# Patient Record
Sex: FEMALE | Race: WHITE | Employment: FULL TIME | URBAN - METROPOLITAN AREA
[De-identification: names, ages, dates, MRNs, and addresses within clinical notes are randomized per-mention and may not be internally consistent; named-entity substitution may affect disease eponyms.]

---

## 2021-09-29 ENCOUNTER — HOSPITAL ENCOUNTER (EMERGENCY)
Age: 51
Discharge: HOME | End: 2021-09-29
Payer: COMMERCIAL

## 2021-09-29 ENCOUNTER — APPOINTMENT (OUTPATIENT)
Dept: CT IMAGING | Age: 51
End: 2021-09-29
Payer: COMMERCIAL

## 2021-09-29 VITALS
HEART RATE: 80 BPM | TEMPERATURE: 98.2 F | WEIGHT: 180 LBS | OXYGEN SATURATION: 99 % | RESPIRATION RATE: 16 BRPM | HEIGHT: 62 IN | DIASTOLIC BLOOD PRESSURE: 95 MMHG | BODY MASS INDEX: 33.13 KG/M2 | SYSTOLIC BLOOD PRESSURE: 155 MMHG

## 2021-09-29 DIAGNOSIS — R10.9 FLANK PAIN: Primary | ICD-10-CM

## 2021-09-29 DIAGNOSIS — R73.9 HYPERGLYCEMIA: ICD-10-CM

## 2021-09-29 LAB
ALBUMIN SERPL-MCNC: 4.7 G/DL (ref 3.5–5.2)
ALP BLD-CCNC: 102 U/L (ref 35–104)
ALT SERPL-CCNC: 16 U/L (ref 0–32)
ANION GAP SERPL CALCULATED.3IONS-SCNC: 16 MMOL/L (ref 7–16)
AST SERPL-CCNC: 34 U/L (ref 0–31)
BACTERIA: ABNORMAL /HPF
BASOPHILS ABSOLUTE: 0.03 E9/L (ref 0–0.2)
BASOPHILS RELATIVE PERCENT: 0.3 % (ref 0–2)
BILIRUB SERPL-MCNC: 1.3 MG/DL (ref 0–1.2)
BILIRUBIN URINE: ABNORMAL
BLOOD, URINE: NEGATIVE
BUN BLDV-MCNC: 12 MG/DL (ref 6–20)
CALCIUM SERPL-MCNC: 10.2 MG/DL (ref 8.6–10.2)
CHLORIDE BLD-SCNC: 91 MMOL/L (ref 98–107)
CLARITY: CLEAR
CO2: 22 MMOL/L (ref 22–29)
COLOR: YELLOW
CREAT SERPL-MCNC: 0.6 MG/DL (ref 0.5–1)
EOSINOPHILS ABSOLUTE: 0.04 E9/L (ref 0.05–0.5)
EOSINOPHILS RELATIVE PERCENT: 0.4 % (ref 0–6)
EPITHELIAL CELLS, UA: ABNORMAL /HPF
GFR AFRICAN AMERICAN: >60
GFR NON-AFRICAN AMERICAN: >60 ML/MIN/1.73
GLUCOSE BLD-MCNC: 232 MG/DL (ref 74–99)
GLUCOSE URINE: 500 MG/DL
HCT VFR BLD CALC: 49.6 % (ref 34–48)
HEMOGLOBIN: 16.8 G/DL (ref 11.5–15.5)
IMMATURE GRANULOCYTES #: 0.06 E9/L
IMMATURE GRANULOCYTES %: 0.5 % (ref 0–5)
KETONES, URINE: >=80 MG/DL
LACTIC ACID: 1.8 MMOL/L (ref 0.5–2.2)
LEUKOCYTE ESTERASE, URINE: NEGATIVE
LIPASE: 56 U/L (ref 13–60)
LYMPHOCYTES ABSOLUTE: 0.71 E9/L (ref 1.5–4)
LYMPHOCYTES RELATIVE PERCENT: 6.4 % (ref 20–42)
MCH RBC QN AUTO: 26.9 PG (ref 26–35)
MCHC RBC AUTO-ENTMCNC: 33.9 % (ref 32–34.5)
MCV RBC AUTO: 79.4 FL (ref 80–99.9)
MONOCYTES ABSOLUTE: 0.61 E9/L (ref 0.1–0.95)
MONOCYTES RELATIVE PERCENT: 5.5 % (ref 2–12)
NEUTROPHILS ABSOLUTE: 9.6 E9/L (ref 1.8–7.3)
NEUTROPHILS RELATIVE PERCENT: 86.9 % (ref 43–80)
NITRITE, URINE: NEGATIVE
PDW BLD-RTO: 13 FL (ref 11.5–15)
PH UA: 5.5 (ref 5–9)
PLATELET # BLD: 198 E9/L (ref 130–450)
PMV BLD AUTO: 10.6 FL (ref 7–12)
POTASSIUM REFLEX MAGNESIUM: 4.6 MMOL/L (ref 3.5–5)
PROTEIN UA: 30 MG/DL
RBC # BLD: 6.25 E12/L (ref 3.5–5.5)
RBC UA: ABNORMAL /HPF (ref 0–2)
SODIUM BLD-SCNC: 129 MMOL/L (ref 132–146)
SPECIFIC GRAVITY UA: >=1.03 (ref 1–1.03)
TOTAL PROTEIN: 8.3 G/DL (ref 6.4–8.3)
UROBILINOGEN, URINE: 0.2 E.U./DL
WBC # BLD: 11.1 E9/L (ref 4.5–11.5)
WBC UA: ABNORMAL /HPF (ref 0–5)

## 2021-09-29 PROCEDURE — 6360000002 HC RX W HCPCS: Performed by: NURSE PRACTITIONER

## 2021-09-29 PROCEDURE — 96375 TX/PRO/DX INJ NEW DRUG ADDON: CPT

## 2021-09-29 PROCEDURE — 99283 EMERGENCY DEPT VISIT LOW MDM: CPT

## 2021-09-29 PROCEDURE — 85025 COMPLETE CBC W/AUTO DIFF WBC: CPT

## 2021-09-29 PROCEDURE — 83690 ASSAY OF LIPASE: CPT

## 2021-09-29 PROCEDURE — 83605 ASSAY OF LACTIC ACID: CPT

## 2021-09-29 PROCEDURE — 2580000003 HC RX 258: Performed by: NURSE PRACTITIONER

## 2021-09-29 PROCEDURE — 87088 URINE BACTERIA CULTURE: CPT

## 2021-09-29 PROCEDURE — 74176 CT ABD & PELVIS W/O CONTRAST: CPT

## 2021-09-29 PROCEDURE — 96374 THER/PROPH/DIAG INJ IV PUSH: CPT

## 2021-09-29 PROCEDURE — 81001 URINALYSIS AUTO W/SCOPE: CPT

## 2021-09-29 PROCEDURE — 80053 COMPREHEN METABOLIC PANEL: CPT

## 2021-09-29 RX ORDER — ORPHENADRINE CITRATE 100 MG/1
100 TABLET, EXTENDED RELEASE ORAL 2 TIMES DAILY
Qty: 14 TABLET | Refills: 0 | Status: SHIPPED | OUTPATIENT
Start: 2021-09-29 | End: 2021-10-06

## 2021-09-29 RX ORDER — ONDANSETRON 2 MG/ML
4 INJECTION INTRAMUSCULAR; INTRAVENOUS ONCE
Status: COMPLETED | OUTPATIENT
Start: 2021-09-29 | End: 2021-09-29

## 2021-09-29 RX ORDER — 0.9 % SODIUM CHLORIDE 0.9 %
1000 INTRAVENOUS SOLUTION INTRAVENOUS ONCE
Status: COMPLETED | OUTPATIENT
Start: 2021-09-29 | End: 2021-09-29

## 2021-09-29 RX ORDER — MORPHINE SULFATE 8 MG/ML
6 INJECTION, SOLUTION INTRAMUSCULAR; INTRAVENOUS ONCE
Status: COMPLETED | OUTPATIENT
Start: 2021-09-29 | End: 2021-09-29

## 2021-09-29 RX ADMIN — MORPHINE SULFATE 6 MG: 8 INJECTION, SOLUTION INTRAMUSCULAR; INTRAVENOUS at 18:06

## 2021-09-29 RX ADMIN — ONDANSETRON 4 MG: 2 INJECTION INTRAMUSCULAR; INTRAVENOUS at 17:26

## 2021-09-29 RX ADMIN — SODIUM CHLORIDE 1000 ML: 9 INJECTION, SOLUTION INTRAVENOUS at 17:26

## 2021-09-29 ASSESSMENT — PAIN DESCRIPTION - PROGRESSION: CLINICAL_PROGRESSION: GRADUALLY WORSENING

## 2021-09-29 ASSESSMENT — PAIN DESCRIPTION - DESCRIPTORS: DESCRIPTORS: CRAMPING

## 2021-09-29 ASSESSMENT — PAIN SCALES - GENERAL
PAINLEVEL_OUTOF10: 2
PAINLEVEL_OUTOF10: 7
PAINLEVEL_OUTOF10: 7

## 2021-09-29 ASSESSMENT — PAIN DESCRIPTION - FREQUENCY
FREQUENCY: CONTINUOUS
FREQUENCY: CONTINUOUS

## 2021-09-29 ASSESSMENT — PAIN DESCRIPTION - ONSET: ONSET: GRADUAL

## 2021-09-29 ASSESSMENT — PAIN DESCRIPTION - LOCATION
LOCATION: ABDOMEN
LOCATION: BACK

## 2021-09-29 ASSESSMENT — PAIN DESCRIPTION - PAIN TYPE
TYPE: ACUTE PAIN
TYPE: ACUTE PAIN

## 2021-09-29 ASSESSMENT — PAIN DESCRIPTION - ORIENTATION: ORIENTATION: LEFT

## 2021-09-29 NOTE — ED PROVIDER NOTES
FIRST PROVIDER CONTACT ASSESSMENT NOTE                                                                                                Department of Emergency Medicine                                                      First Provider Note  21  3:43 PM EDT  NAME: Marshall Stevens  : 1970  MRN: [id number]    Chief Complaint: Abdominal Pain (left), Back Pain (lumbar), and Emesis      History of Present Illness:   Marshall Stevens is a 54 y.o. female who presents to the ED for left sided upper abdominal pain, back pain. Onset last week. N/V. Denies history of kidney stones. Denies urinary burning or hematuria. Focused Physical Exam:  VS:    ED Triage Vitals [21 1540]   BP Temp Temp src Pulse Resp SpO2 Height Weight   (!) 179/89 98.2 °F (36.8 °C) -- 109 18 99 % 5' 2\" (1.575 m) 180 lb (81.6 kg)        General: Alert and in no apparent distress. Medical History:  has a past medical history of Arthritis. Surgical History:  has a past surgical history that includes Ankle surgery. Social History:  reports that she has never smoked. She does not have any smokeless tobacco history on file. She reports that she does not drink alcohol and does not use drugs. Family History: family history is not on file. Allergies: Patient has no known allergies.      Initial Plan of Care:  Initiate Treatment-Testing, Proceed toTreatment Area When Bed Available for ED Attending/MLP to Continue Care    -------------------------------------------------END OF FIRST PROVIDER CONTACT ASSESSMENT NOTE--------------------------------------------------------  Electronically signed by Margot Pedroza PA-C   DD: 21       Margot Pedroza PA-C  [phone]

## 2021-09-29 NOTE — ED PROVIDER NOTES
[address] [place] [address]  Department of Emergency Medicine   ED  Encounter Note  Admit Date/RoomTime: 2021  4:47 PM  ED Room: [facility]    NAME: Jona Vaughn  : 1970  MRN: [id number]     Chief Complaint:  Abdominal Pain (left), Back Pain (lumbar), and Emesis    History of Present Illness       Jona Vaughn is a 54 y.o. old female who presents to the emergency department by private vehicle, for complaints of constant of pain in the left flank without radiation which began 1 week(s) prior to arrival.  Since onset the symptoms have been persistent and moderate in severity. Associated sign's & symptoms: Nausea and several episodes of vomiting. The symptoms are aggravated by nothing and relieved by nothing. She denies headache, fever, chills, neck pain, chest pain, shortness of breath, cough, diarrhea, dysuria, or rash. .  ROS   Pertinent positives and negatives are stated within HPI, all other systems reviewed and are negative. Past Medical History:  has a past medical history of Arthritis. Surgical History:  has a past surgical history that includes Ankle surgery and Hysterectomy. Social History:  reports that she has never smoked. She does not have any smokeless tobacco history on file. She reports that she does not drink alcohol and does not use drugs. Family History: family history is not on file. Allergies: Patient has no known allergies. Physical Exam   Oxygen Saturation Interpretation: Normal.        ED Triage Vitals [21 1540]   BP Temp Temp src Pulse Resp SpO2 Height Weight   (!) 179/89 98.2 °F (36.8 °C) -- 109 18 99 % 5' 2\" (1.575 m) 180 lb (81.6 kg)         Constitutional:  Alert, development consistent with age. HEENT:  NC/NT. Airway patent. Neck:  Normal ROM. Supple.   Respiratory:  Clear to auscultation and breath sounds equal.  CV:  Regular rate and rhythm, normal heart sounds, without pathological murmurs, ectopy, gallops, or rubs.  GI:  normal appearing, non-distended with no visible hernias. Bowel sounds: normal bowel sounds. Tenderness: No abdominal tenderness, guarding, rebound, rigidity or pulsatile mass. .        Liver: non-tender. Spleen:  non-tender and non-palpable. /Back: CVA Tenderness: No CVA tenderness. Integument:  Normal turgor. Warm, dry, without visible rash, unless noted elsewhere. Lymphatics: No lymphangitis or adenopathy noted. Neurological:  Oriented. Motor functions intact.     Lab / Imaging Results   (All laboratory and radiology results have been personally reviewed by myself)  Labs:  Results for orders placed or performed during the hospital encounter of 09/29/21   CBC Auto Differential   Result Value Ref Range    WBC 11.1 4.5 - 11.5 E9/L    RBC 6.25 (H) 3.50 - 5.50 E12/L    Hemoglobin 16.8 (H) 11.5 - 15.5 g/dL    Hematocrit 49.6 (H) 34.0 - 48.0 %    MCV 79.4 (L) 80.0 - 99.9 fL    MCH 26.9 26.0 - 35.0 pg    MCHC 33.9 32.0 - 34.5 %    RDW 13.0 11.5 - 15.0 fL    Platelets 982 950 - 676 E9/L    MPV 10.6 7.0 - 12.0 fL    Neutrophils % 86.9 (H) 43.0 - 80.0 %    Immature Granulocytes % 0.5 0.0 - 5.0 %    Lymphocytes % 6.4 (L) 20.0 - 42.0 %    Monocytes % 5.5 2.0 - 12.0 %    Eosinophils % 0.4 0.0 - 6.0 %    Basophils % 0.3 0.0 - 2.0 %    Neutrophils Absolute 9.60 (H) 1.80 - 7.30 E9/L    Immature Granulocytes # 0.06 E9/L    Lymphocytes Absolute 0.71 (L) 1.50 - 4.00 E9/L    Monocytes Absolute 0.61 0.10 - 0.95 E9/L    Eosinophils Absolute 0.04 (L) 0.05 - 0.50 E9/L    Basophils Absolute 0.03 0.00 - 0.20 E9/L   Comprehensive Metabolic Panel w/ Reflex to MG   Result Value Ref Range    Sodium 129 (L) 132 - 146 mmol/L    Potassium reflex Magnesium 4.6 3.5 - 5.0 mmol/L    Chloride 91 (L) 98 - 107 mmol/L    CO2 22 22 - 29 mmol/L    Anion Gap 16 7 - 16 mmol/L    Glucose 232 (H) 74 - 99 mg/dL    BUN 12 6 - 20 mg/dL    CREATININE 0.6 0.5 - 1.0 mg/dL    GFR Non-African American >60 >=60 mL/min/1.73 GFR African American >60     Calcium 10.2 8.6 - 10.2 mg/dL    Total Protein 8.3 6.4 - 8.3 g/dL    Albumin 4.7 3.5 - 5.2 g/dL    Total Bilirubin 1.3 (H) 0.0 - 1.2 mg/dL    Alkaline Phosphatase 102 35 - 104 U/L    ALT 16 0 - 32 U/L    AST 34 (H) 0 - 31 U/L   Lactic Acid, Plasma   Result Value Ref Range    Lactic Acid 1.8 0.5 - 2.2 mmol/L   Urinalysis   Result Value Ref Range    Color, UA Yellow Straw/Yellow    Clarity, UA Clear Clear    Glucose, Ur 500 (A) Negative mg/dL    Bilirubin Urine SMALL (A) Negative    Ketones, Urine >=80 (A) Negative mg/dL    Specific Gravity, UA >=1.030 1.005 - 1.030    Blood, Urine Negative Negative    pH, UA 5.5 5.0 - 9.0    Protein, UA 30 (A) Negative mg/dL    Urobilinogen, Urine 0.2 <2.0 E.U./dL    Nitrite, Urine Negative Negative    Leukocyte Esterase, Urine Negative Negative   Lipase   Result Value Ref Range    Lipase 56 13 - 60 U/L   Microscopic Urinalysis   Result Value Ref Range    WBC, UA 2-5 0 - 5 /HPF    RBC, UA 1-3 0 - 2 /HPF    Epithelial Cells, UA FEW /HPF    Bacteria, UA FEW (A) None Seen /HPF       Imaging: All Radiology results interpreted by Radiologist unless otherwise noted. CT ABDOMEN PELVIS WO CONTRAST Additional Contrast? None   Final Result   1. No evidence of urolithiasis or hydronephrosis. 2. Colonic diverticulosis without evidence of diverticulitis. 3. Mild splenomegaly. 4. Fatty liver. 5. Probable small hiatal hernia. 6. Bilateral sacroiliitis. ED Course / Medical Decision Making     Medications   0.9 % sodium chloride bolus (0 mLs IntraVENous Stopped 9/29/21 2000)   morphine sulfate (PF) injection 6 mg (6 mg IntraVENous Given 9/29/21 1806)   ondansetron (ZOFRAN) injection 4 mg (4 mg IntraVENous Given 9/29/21 1726)          Re-examination:  9/29/21       Time: 1815  Patients condition is improving after treatment. Patient appears comfortable. Consult(s):   None    Procedure(s):   None.     MDM:   Patient presented with left flank pain.  Her diagnostics reviewed and discussed with her. CT of the abdomen pelvis was negative for acute pathology. She was noted to have a blood glucose of 232 and this was discussed with the patient. All of her diagnostics were reviewed and discussed with her. She was treated symptomatically in the emergency department and her symptoms greatly improved. She was negative for acute abdomen. She appeared well nontoxic. She is appropriate for discharge and outpatient follow-up. She is instructed to return to the emergency department immediately with any new or worsening symptoms. She is prescribed Norflex for muscular pain. Plan of Care/Counseling:  RENNY Butt CNP reviewed today's visit with the patient in addition to providing specific details for the plan of care and counseling regarding the diagnosis and prognosis. Questions are answered at this time and are agreeable with the plan. Assessment      1. Flank pain    2. Hyperglycemia      Plan   Discharged home. Patient condition is good    New Medications     Discharge Medication List as of 9/29/2021  7:59 PM      START taking these medications    Details   orphenadrine (NORFLEX) 100 MG extended release tablet Take 1 tablet by mouth 2 times daily for 7 days, Disp-14 tablet, R-0Print           Electronically signed by RENNY Butt CNP   DD: 9/29/21  **This report was transcribed using voice recognition software. Every effort was made to ensure accuracy; however, inadvertent computerized transcription errors may be present.   END OF ED PROVIDER NOTE       RENNY Tyson CNP  09/29/21 2040

## 2021-10-01 LAB — URINE CULTURE, ROUTINE: NORMAL

## 2021-10-06 ENCOUNTER — APPOINTMENT (OUTPATIENT)
Dept: CT IMAGING | Age: 51
End: 2021-10-06
Payer: COMMERCIAL

## 2021-10-06 ENCOUNTER — HOSPITAL ENCOUNTER (EMERGENCY)
Age: 51
Discharge: HOME | End: 2021-10-06
Attending: EMERGENCY MEDICINE
Payer: COMMERCIAL

## 2021-10-06 VITALS
DIASTOLIC BLOOD PRESSURE: 106 MMHG | WEIGHT: 130 LBS | RESPIRATION RATE: 16 BRPM | SYSTOLIC BLOOD PRESSURE: 186 MMHG | HEIGHT: 62 IN | TEMPERATURE: 98.5 F | OXYGEN SATURATION: 99 % | BODY MASS INDEX: 23.92 KG/M2 | HEART RATE: 92 BPM

## 2021-10-06 DIAGNOSIS — R10.32 LEFT LOWER QUADRANT ABDOMINAL PAIN: ICD-10-CM

## 2021-10-06 DIAGNOSIS — K59.00 CONSTIPATION, UNSPECIFIED CONSTIPATION TYPE: Primary | ICD-10-CM

## 2021-10-06 DIAGNOSIS — M54.50 ACUTE BILATERAL LOW BACK PAIN, UNSPECIFIED WHETHER SCIATICA PRESENT: ICD-10-CM

## 2021-10-06 LAB
ALBUMIN SERPL-MCNC: 4.4 G/DL (ref 3.5–5.2)
ALP BLD-CCNC: 101 U/L (ref 35–104)
ALT SERPL-CCNC: 13 U/L (ref 0–32)
ANION GAP SERPL CALCULATED.3IONS-SCNC: 14 MMOL/L (ref 7–16)
AST SERPL-CCNC: 23 U/L (ref 0–31)
BACTERIA: ABNORMAL /HPF
BASOPHILS ABSOLUTE: 0.01 E9/L (ref 0–0.2)
BASOPHILS RELATIVE PERCENT: 0.1 % (ref 0–2)
BILIRUB SERPL-MCNC: 1.1 MG/DL (ref 0–1.2)
BILIRUBIN URINE: ABNORMAL
BLOOD, URINE: NEGATIVE
BUN BLDV-MCNC: 6 MG/DL (ref 6–20)
CALCIUM SERPL-MCNC: 9.9 MG/DL (ref 8.6–10.2)
CHLORIDE BLD-SCNC: 94 MMOL/L (ref 98–107)
CLARITY: CLEAR
CO2: 25 MMOL/L (ref 22–29)
COLOR: YELLOW
CREAT SERPL-MCNC: 0.6 MG/DL (ref 0.5–1)
EKG ATRIAL RATE: 96 BPM
EKG P AXIS: 23 DEGREES
EKG P-R INTERVAL: 164 MS
EKG Q-T INTERVAL: 378 MS
EKG QRS DURATION: 90 MS
EKG QTC CALCULATION (BAZETT): 477 MS
EKG R AXIS: -106 DEGREES
EKG T AXIS: -5 DEGREES
EKG VENTRICULAR RATE: 96 BPM
EOSINOPHILS ABSOLUTE: 0.04 E9/L (ref 0.05–0.5)
EOSINOPHILS RELATIVE PERCENT: 0.5 % (ref 0–6)
EPITHELIAL CELLS, UA: ABNORMAL /HPF
GFR AFRICAN AMERICAN: >60
GFR NON-AFRICAN AMERICAN: >60 ML/MIN/1.73
GLUCOSE BLD-MCNC: 200 MG/DL (ref 74–99)
GLUCOSE URINE: NEGATIVE MG/DL
HCT VFR BLD CALC: 48.3 % (ref 34–48)
HEMOGLOBIN: 16.4 G/DL (ref 11.5–15.5)
IMMATURE GRANULOCYTES #: 0.03 E9/L
IMMATURE GRANULOCYTES %: 0.4 % (ref 0–5)
KETONES, URINE: 40 MG/DL
LACTIC ACID: 1.3 MMOL/L (ref 0.5–2.2)
LEUKOCYTE ESTERASE, URINE: ABNORMAL
LIPASE: 69 U/L (ref 13–60)
LYMPHOCYTES ABSOLUTE: 0.7 E9/L (ref 1.5–4)
LYMPHOCYTES RELATIVE PERCENT: 9.6 % (ref 20–42)
MCH RBC QN AUTO: 26.7 PG (ref 26–35)
MCHC RBC AUTO-ENTMCNC: 34 % (ref 32–34.5)
MCV RBC AUTO: 78.7 FL (ref 80–99.9)
MONOCYTES ABSOLUTE: 0.32 E9/L (ref 0.1–0.95)
MONOCYTES RELATIVE PERCENT: 4.4 % (ref 2–12)
MUCUS: PRESENT /LPF
NEUTROPHILS ABSOLUTE: 6.19 E9/L (ref 1.8–7.3)
NEUTROPHILS RELATIVE PERCENT: 85 % (ref 43–80)
NITRITE, URINE: NEGATIVE
PDW BLD-RTO: 12.7 FL (ref 11.5–15)
PH UA: 6 (ref 5–9)
PLATELET # BLD: 155 E9/L (ref 130–450)
PMV BLD AUTO: 9.8 FL (ref 7–12)
POTASSIUM SERPL-SCNC: 4.3 MMOL/L (ref 3.5–5)
PROTEIN UA: NEGATIVE MG/DL
RBC # BLD: 6.14 E12/L (ref 3.5–5.5)
RBC UA: ABNORMAL /HPF (ref 0–2)
SODIUM BLD-SCNC: 133 MMOL/L (ref 132–146)
SPECIFIC GRAVITY UA: >=1.03 (ref 1–1.03)
TOTAL PROTEIN: 7.7 G/DL (ref 6.4–8.3)
UROBILINOGEN, URINE: 2 E.U./DL
WBC # BLD: 7.3 E9/L (ref 4.5–11.5)
WBC UA: ABNORMAL /HPF (ref 0–5)

## 2021-10-06 PROCEDURE — 6360000002 HC RX W HCPCS: Performed by: EMERGENCY MEDICINE

## 2021-10-06 PROCEDURE — 81001 URINALYSIS AUTO W/SCOPE: CPT

## 2021-10-06 PROCEDURE — 83690 ASSAY OF LIPASE: CPT

## 2021-10-06 PROCEDURE — 93005 ELECTROCARDIOGRAM TRACING: CPT | Performed by: PHYSICIAN ASSISTANT

## 2021-10-06 PROCEDURE — 96375 TX/PRO/DX INJ NEW DRUG ADDON: CPT

## 2021-10-06 PROCEDURE — 6360000002 HC RX W HCPCS

## 2021-10-06 PROCEDURE — 2500000003 HC RX 250 WO HCPCS: Performed by: EMERGENCY MEDICINE

## 2021-10-06 PROCEDURE — 80053 COMPREHEN METABOLIC PANEL: CPT

## 2021-10-06 PROCEDURE — 96374 THER/PROPH/DIAG INJ IV PUSH: CPT

## 2021-10-06 PROCEDURE — 6370000000 HC RX 637 (ALT 250 FOR IP): Performed by: EMERGENCY MEDICINE

## 2021-10-06 PROCEDURE — 36415 COLL VENOUS BLD VENIPUNCTURE: CPT

## 2021-10-06 PROCEDURE — 2580000003 HC RX 258: Performed by: RADIOLOGY

## 2021-10-06 PROCEDURE — 6360000004 HC RX CONTRAST MEDICATION: Performed by: RADIOLOGY

## 2021-10-06 PROCEDURE — 2580000003 HC RX 258: Performed by: EMERGENCY MEDICINE

## 2021-10-06 PROCEDURE — 93010 ELECTROCARDIOGRAM REPORT: CPT | Performed by: INTERNAL MEDICINE

## 2021-10-06 PROCEDURE — 83605 ASSAY OF LACTIC ACID: CPT

## 2021-10-06 PROCEDURE — 99285 EMERGENCY DEPT VISIT HI MDM: CPT

## 2021-10-06 PROCEDURE — 85025 COMPLETE CBC W/AUTO DIFF WBC: CPT

## 2021-10-06 PROCEDURE — 74177 CT ABD & PELVIS W/CONTRAST: CPT

## 2021-10-06 RX ORDER — SODIUM CHLORIDE 0.9 % (FLUSH) 0.9 %
10 SYRINGE (ML) INJECTION PRN
Status: COMPLETED | OUTPATIENT
Start: 2021-10-06 | End: 2021-10-06

## 2021-10-06 RX ORDER — DOCUSATE SODIUM 100 MG/1
100 CAPSULE, LIQUID FILLED ORAL 2 TIMES DAILY PRN
Qty: 20 CAPSULE | Refills: 0 | Status: SHIPPED | OUTPATIENT
Start: 2021-10-06 | End: 2021-10-11

## 2021-10-06 RX ORDER — 0.9 % SODIUM CHLORIDE 0.9 %
1000 INTRAVENOUS SOLUTION INTRAVENOUS ONCE
Status: COMPLETED | OUTPATIENT
Start: 2021-10-06 | End: 2021-10-06

## 2021-10-06 RX ORDER — DICLOFENAC POTASSIUM 50 MG/1
50 TABLET, FILM COATED ORAL 3 TIMES DAILY PRN
Qty: 20 TABLET | Refills: 3 | Status: SHIPPED | OUTPATIENT
Start: 2021-10-06 | End: 2021-11-01

## 2021-10-06 RX ORDER — ONDANSETRON 2 MG/ML
4 INJECTION INTRAMUSCULAR; INTRAVENOUS ONCE
Status: COMPLETED | OUTPATIENT
Start: 2021-10-06 | End: 2021-10-06

## 2021-10-06 RX ORDER — POLYETHYLENE GLYCOL 3350 17 G/17G
17 POWDER, FOR SOLUTION ORAL 2 TIMES DAILY PRN
Qty: 510 G | Refills: 0 | Status: SHIPPED | OUTPATIENT
Start: 2021-10-06 | End: 2021-11-01

## 2021-10-06 RX ORDER — MORPHINE SULFATE 4 MG/ML
4 INJECTION, SOLUTION INTRAMUSCULAR; INTRAVENOUS ONCE
Status: DISCONTINUED | OUTPATIENT
Start: 2021-10-06 | End: 2021-10-06 | Stop reason: HOSPADM

## 2021-10-06 RX ORDER — LIDOCAINE 50 MG/G
1 PATCH TOPICAL EVERY 24 HOURS
Qty: 10 PATCH | Refills: 0 | Status: SHIPPED | OUTPATIENT
Start: 2021-10-06 | End: 2021-10-16

## 2021-10-06 RX ORDER — CYCLOBENZAPRINE HCL 10 MG
10 TABLET ORAL 3 TIMES DAILY PRN
Qty: 21 TABLET | Refills: 0 | Status: SHIPPED | OUTPATIENT
Start: 2021-10-06 | End: 2021-10-16

## 2021-10-06 RX ORDER — MORPHINE SULFATE 2 MG/ML
INJECTION, SOLUTION INTRAMUSCULAR; INTRAVENOUS
Status: COMPLETED
Start: 2021-10-06 | End: 2021-10-06

## 2021-10-06 RX ADMIN — SODIUM CHLORIDE 1000 ML: 9 INJECTION, SOLUTION INTRAVENOUS at 15:44

## 2021-10-06 RX ADMIN — IOPAMIDOL 75 ML: 755 INJECTION, SOLUTION INTRAVENOUS at 16:44

## 2021-10-06 RX ADMIN — IOHEXOL 50 ML: 240 INJECTION, SOLUTION INTRATHECAL; INTRAVASCULAR; INTRAVENOUS; ORAL at 15:50

## 2021-10-06 RX ADMIN — ONDANSETRON 4 MG: 2 INJECTION INTRAMUSCULAR; INTRAVENOUS at 16:19

## 2021-10-06 RX ADMIN — MORPHINE SULFATE 4 MG: 2 INJECTION, SOLUTION INTRAMUSCULAR; INTRAVENOUS at 16:22

## 2021-10-06 RX ADMIN — MAGNESIUM CITRATE 300 ML: 1.75 LIQUID ORAL at 20:48

## 2021-10-06 RX ADMIN — FAMOTIDINE 20 MG: 10 INJECTION, SOLUTION INTRAVENOUS at 16:21

## 2021-10-06 RX ADMIN — Medication 10 ML: at 16:41

## 2021-10-06 ASSESSMENT — PAIN DESCRIPTION - DESCRIPTORS: DESCRIPTORS: ACHING;CONSTANT

## 2021-10-06 ASSESSMENT — PAIN DESCRIPTION - PROGRESSION: CLINICAL_PROGRESSION: GRADUALLY WORSENING

## 2021-10-06 ASSESSMENT — PAIN DESCRIPTION - LOCATION: LOCATION: ABDOMEN;BACK

## 2021-10-06 ASSESSMENT — PAIN DESCRIPTION - PAIN TYPE: TYPE: ACUTE PAIN

## 2021-10-06 ASSESSMENT — PAIN DESCRIPTION - FREQUENCY: FREQUENCY: CONTINUOUS

## 2021-10-06 ASSESSMENT — PAIN SCALES - GENERAL
PAINLEVEL_OUTOF10: 7

## 2021-10-06 ASSESSMENT — PAIN DESCRIPTION - ORIENTATION: ORIENTATION: LOWER

## 2021-10-06 ASSESSMENT — PAIN DESCRIPTION - ONSET: ONSET: GRADUAL

## 2021-10-06 NOTE — ED PROVIDER NOTES
Roe Kanner is a 54 y.o. female presenting to the ED for abdominal pain, constipation, beginning 10 days ago. The complaint has been intermittent, moderate in severity, and worsened by nothing. 55 yo f who has chronic rheumatoid arthritis and rash from that notes has taken biscodyl 2 tab twice once last night and once this am states has left lower abbdominal pain and constipation for 10 dauys, hasnt been eating eitber. Pt was here last wed and was given norflex for back pain. Has been having left lower back pain that does not radiate. Pt ntopes had a massage which resolved sx then states she did tyoo much Monday which made her back worse, abdominal pain is descreiobed as pressure and 7/10 and moderate. Pt notes she feels her abdomen is full. Pt is not on narcotics. Pt saw her pcp over 3 years ago but recently made a appointment for 10/26/21 at 0800. Pt has no fever or chills cough or cold sx. Pt has only been taking advil and norflex. P[t denies any urinary retention or incontinence, denies numbenss or weakness in her legs, denies any perinal or groin numbness ior tingling. Pt works with [employer] and does a lot of lifting and restraining. Pt has had a hysterectomy in the past. Pt has been using tens and heating pad on low back causing 1st degree burn    Review of Systems:   Pertinent positives and negatives are stated within HPI, all other systems reviewed and are negative.          --------------------------------------------- PAST HISTORY ---------------------------------------------  Past Medical History:  has a past medical history of Arthritis. Past Surgical History:  has a past surgical history that includes Ankle surgery and Hysterectomy. Social History:  reports that she has never smoked. She does not have any smokeless tobacco history on file. She reports that she does not drink alcohol and does not use drugs. Family History: family history is not on file.      The patients home medications have been reviewed. Allergies: Patient has no known allergies.     -------------------------------------------------- RESULTS -------------------------------------------------  All laboratory and radiology results have been personally reviewed by myself   LABS:  Results for orders placed or performed during the hospital encounter of 10/06/21   CBC Auto Differential   Result Value Ref Range    WBC 7.3 4.5 - 11.5 E9/L    RBC 6.14 (H) 3.50 - 5.50 E12/L    Hemoglobin 16.4 (H) 11.5 - 15.5 g/dL    Hematocrit 48.3 (H) 34.0 - 48.0 %    MCV 78.7 (L) 80.0 - 99.9 fL    MCH 26.7 26.0 - 35.0 pg    MCHC 34.0 32.0 - 34.5 %    RDW 12.7 11.5 - 15.0 fL    Platelets 072 137 - 195 E9/L    MPV 9.8 7.0 - 12.0 fL    Neutrophils % 85.0 (H) 43.0 - 80.0 %    Immature Granulocytes % 0.4 0.0 - 5.0 %    Lymphocytes % 9.6 (L) 20.0 - 42.0 %    Monocytes % 4.4 2.0 - 12.0 %    Eosinophils % 0.5 0.0 - 6.0 %    Basophils % 0.1 0.0 - 2.0 %    Neutrophils Absolute 6.19 1.80 - 7.30 E9/L    Immature Granulocytes # 0.03 E9/L    Lymphocytes Absolute 0.70 (L) 1.50 - 4.00 E9/L    Monocytes Absolute 0.32 0.10 - 0.95 E9/L    Eosinophils Absolute 0.04 (L) 0.05 - 0.50 E9/L    Basophils Absolute 0.01 0.00 - 0.20 E9/L   Comprehensive Metabolic Panel   Result Value Ref Range    Sodium 133 132 - 146 mmol/L    Potassium 4.3 3.5 - 5.0 mmol/L    Chloride 94 (L) 98 - 107 mmol/L    CO2 25 22 - 29 mmol/L    Anion Gap 14 7 - 16 mmol/L    Glucose 200 (H) 74 - 99 mg/dL    BUN 6 6 - 20 mg/dL    CREATININE 0.6 0.5 - 1.0 mg/dL    GFR Non-African American >60 >=60 mL/min/1.73    GFR African American >60     Calcium 9.9 8.6 - 10.2 mg/dL    Total Protein 7.7 6.4 - 8.3 g/dL    Albumin 4.4 3.5 - 5.2 g/dL    Total Bilirubin 1.1 0.0 - 1.2 mg/dL    Alkaline Phosphatase 101 35 - 104 U/L    ALT 13 0 - 32 U/L    AST 23 0 - 31 U/L   Lipase   Result Value Ref Range    Lipase 69 (H) 13 - 60 U/L   Lactic Acid, Plasma   Result Value Ref Range    Lactic Acid 1.3 0.5 - 2.2 mmol/L Urinalysis with Microscopic   Result Value Ref Range    Color, UA Yellow Straw/Yellow    Clarity, UA Clear Clear    Glucose, Ur Negative Negative mg/dL    Bilirubin Urine SMALL (A) Negative    Ketones, Urine 40 (A) Negative mg/dL    Specific Gravity, UA >=1.030 1.005 - 1.030    Blood, Urine Negative Negative    pH, UA 6.0 5.0 - 9.0    Protein, UA Negative Negative mg/dL    Urobilinogen, Urine 2.0 (A) <2.0 E.U./dL    Nitrite, Urine Negative Negative    Leukocyte Esterase, Urine TRACE (A) Negative    Mucus, UA Present (A) None Seen /LPF    WBC, UA 2-5 0 - 5 /HPF    RBC, UA NONE 0 - 2 /HPF    Epithelial Cells, UA FEW /HPF    Bacteria, UA FEW (A) None Seen /HPF   EKG 12 Lead   Result Value Ref Range    Ventricular Rate 96 BPM    Atrial Rate 96 BPM    P-R Interval 164 ms    QRS Duration 90 ms    Q-T Interval 378 ms    QTc Calculation (Bazett) 477 ms    P Axis 23 degrees    R Axis -106 degrees    T Axis -5 degrees       RADIOLOGY:  Interpreted by Radiologist.  CT ABDOMEN PELVIS W IV CONTRAST Additional Contrast? Oral   Final Result   Findings again most compatible with constipation, slightly more prominent in   the transverse colon than comparison      Scattered slight diverticulosis again without CT evidence of diverticulitis. Mild hepatic steatosis again suggested      Very small fat containing ventral hernia above the umbilicus      Status post hysterectomy             ------------------------- NURSING NOTES AND VITALS REVIEWED ---------------------------   The nursing notes within the ED encounter and vital signs as below have been reviewed. BP (!) 186/106   Pulse 92   Temp 98.5 °F (36.9 °C)   Resp 16   Ht 5' 2\" (1.575 m)   Wt 130 lb (59 kg)   SpO2 99%   BMI 23.78 kg/m²   Oxygen Saturation Interpretation: Normal      ---------------------------------------------------PHYSICAL EXAM--------------------------------------    Physical Exam  Vitals reviewed.    Constitutional:       General: She is not in acute distress. Appearance: Normal appearance. She is not toxic-appearing. HENT:      Head: Normocephalic and atraumatic. Right Ear: External ear normal.      Left Ear: External ear normal.      Nose: Nose normal. No congestion. Mouth/Throat:      Mouth: Mucous membranes are moist.      Pharynx: Oropharynx is clear. No posterior oropharyngeal erythema. Eyes:      Extraocular Movements: Extraocular movements intact. Pupils: Pupils are equal, round, and reactive to light. Cardiovascular:      Rate and Rhythm: Normal rate and regular rhythm. Pulses: Normal pulses. Heart sounds: No murmur heard. Pulmonary:      Effort: Pulmonary effort is normal.      Breath sounds: No wheezing or rhonchi. Chest:      Chest wall: No tenderness. Abdominal:      General: Bowel sounds are normal.      Tenderness: There is no abdominal tenderness. There is left CVA tenderness. There is no right CVA tenderness or guarding. Musculoskeletal:         General: No swelling or deformity. Cervical back: Normal range of motion and neck supple. No muscular tenderness. Skin:     General: Skin is warm and dry. Capillary Refill: Capillary refill takes less than 2 seconds. Neurological:      General: No focal deficit present. Mental Status: She is alert and oriented to person, place, and time.    Psychiatric:         Mood and Affect: Mood normal.               ------------------------------ ED COURSE/MEDICAL DECISION MAKING----------------------  Medications   morphine sulfate (PF) injection 4 mg (4 mg IntraVENous Not Given 10/6/21 1624)   magnesium citrate solution 300 mL (has no administration in time range)   0.9 % sodium chloride bolus (0 mLs IntraVENous Stopped 10/6/21 1739)   ondansetron (ZOFRAN) injection 4 mg (4 mg IntraVENous Given 10/6/21 1619)   famotidine (PEPCID) injection 20 mg (20 mg IntraVENous Given 10/6/21 1621)   morphine 2 MG/ML injection (4 mg  Given 10/6/21 1622) iopamidol (ISOVUE-370) 76 % injection 75 mL (75 mLs IntraVENous Given 10/6/21 1644)   iohexol (OMNIPAQUE 240) injection 50 mL (50 mLs Oral Given 10/6/21 1550)   sodium chloride flush 0.9 % injection 10 mL (10 mLs IntraVENous Given 10/6/21 1641)     ED COURSE:       EKG: This EKG is signed and interpreted by me. 06-OCT-2021   Rate:90s  Rhythm: Sinus  rhytm,  Interpretation: non-specific there is old ant infarct EKG  Comparison: no previous EKG available      Medical Decision Making:    Patient found to have constipation. She is taking bisacodyl twice at home and nothing else. She will be given mag citrate and an enema. She will be given multiple prescriptions for home. She also has back pain that she states she exacerbated by lifting things. She states her massage therapist really helps her. She has appointment scheduled with her primary doctor later this month however she could not get in this week so she came in here. Her CAT scans blood work are unremarkable she has no signs of cauda equina syndrome. She was given pain medicine felt better she will be discharged and follow-up with her PCP she is to call them again tomorrow to see if she can get in sooner. I also recommended she call pain management so she can get into see them. She also has rheumatoid arthritis and chronic joint pain and they be able to help her with that as well. We discussed warning signs symptoms when to return. She verbalized understanding she states she will call her doctors tomorrow. Risks and benefits were discussed with patient for All medications dispensed and given in department as well prescriptions prescribed for home, . The patient elected to take the medicine. Pt instructed on warning signs and precautions for medication side effects. The patient was given warning signs for when to seek medical attention.        Counseled regarding todays diagnosis, including possible risks and complications,  especially if left uncontrolled. Counseled regarding the possible side effects, risks, benefits and alternatives to treatment; patient and/or guardian verbalizes understanding, agrees, feels comfortable with and wishes to proceed with treatment plan. Advised patient to call her primary care physician with any new medication issues, and read all Rx info from pharmacy to assure aware of all possible risks and side effects of medication before taking. I did discuss warning signs for when to return to the Emergency Room, and the patient verbalized understanding      Counseling: The emergency provider has spoken with the patient and discussed todays results, in addition to providing specific details for the plan of care and counseling regarding the diagnosis and prognosis. Questions are answered at this time and they are agreeable with the plan.      --------------------------------- IMPRESSION AND DISPOSITION ---------------------------------    IMPRESSION  1. Constipation, unspecified constipation type    2. Acute bilateral low back pain, unspecified whether sciatica present    3. Left lower quadrant abdominal pain        DISPOSITION  Disposition: Discharge to home  Patient condition is good      NOTE: This report was transcribed using voice recognition software.  Every effort was made to ensure accuracy; however, inadvertent computerized transcription errors may be present       Kylah Benites, DO  10/06/21 2039       Kylah Benites, DO  10/06/21 2040

## 2021-10-06 NOTE — Clinical Note
Marleny Mukherjee was seen and treated in our emergency department on 10/6/2021. She may return to work on 10/11/2021. If you have any questions or concerns, please don't hesitate to call.       Analisa Faulkner, DO

## 2021-10-06 NOTE — ED NOTES
Name: Marshall Stevens  : 1970  MRN: [id number]    Date: 10/6/2021    Benefits of immediately proceeding with Radiology exam outweigh the risks and therefore the following is being waived:      [x] Pregnancy test    [] Protocol for Iodine allergy    [] MRI questionnaire    [] BUN/Creatinine        Christia Prader, DO Christia Prader, DO  10/06/21 [id number]

## 2021-10-06 NOTE — ED TRIAGE NOTES
FIRST PROVIDER CONTACT ASSESSMENT NOTE      Department of Emergency Medicine   10/6/21  1:50 PM EDT    Chief Complaint: Abdominal Pain, Back Pain, Constipation (since sunday 9/26), and Emesis      History of Present Illness:    Delano Avilez is a 54 y.o. female who presents to the ED by private car for abdominal pain, back pain, constipation, vomiting x 1 week    Focused Screening Exam:  Constitutional:  Alert, appears stated age and is in no distress. *ALLERGIES*     Patient has no known allergies.      ED Triage Vitals [10/06/21 1348]   BP Temp Temp src Pulse Resp SpO2 Height Weight   (!) 191/120 98.5 °F (36.9 °C) -- 105 16 99 % 5' 2\" (1.575 m) 130 lb (59 kg)        Initial Plan of Care:  Initiate Treatment-Testing, Proceed toTreatment Area When Bed Available for ED Attending/MLP to Continue Care    -----------------END OF FIRST PROVIDER CONTACT ASSESSMENT NOTE--------------  Electronically signed by Esther Polanco PA-C   DD: 10/6/21

## 2021-10-07 NOTE — ED NOTES
Soap suds enema complete.  Pt was able to pass a moderate amount of stool     Anna Greer RN  10/06/21 5921

## 2021-10-26 ENCOUNTER — OFFICE VISIT (OUTPATIENT)
Dept: PRIMARY CARE CLINIC | Age: 51
End: 2021-10-26
Payer: COMMERCIAL

## 2021-10-26 VITALS
BODY MASS INDEX: 32.94 KG/M2 | SYSTOLIC BLOOD PRESSURE: 136 MMHG | DIASTOLIC BLOOD PRESSURE: 84 MMHG | WEIGHT: 179 LBS | OXYGEN SATURATION: 98 % | RESPIRATION RATE: 16 BRPM | HEART RATE: 105 BPM | HEIGHT: 62 IN

## 2021-10-26 DIAGNOSIS — R73.09 ELEVATED GLUCOSE: ICD-10-CM

## 2021-10-26 DIAGNOSIS — E11.65 TYPE 2 DIABETES MELLITUS WITH HYPERGLYCEMIA, WITHOUT LONG-TERM CURRENT USE OF INSULIN (HCC): ICD-10-CM

## 2021-10-26 DIAGNOSIS — M54.6 ACUTE BILATERAL THORACIC BACK PAIN: ICD-10-CM

## 2021-10-26 DIAGNOSIS — Z12.11 SCREENING FOR COLON CANCER: ICD-10-CM

## 2021-10-26 DIAGNOSIS — M06.9 RHEUMATOID ARTHRITIS, INVOLVING UNSPECIFIED SITE, UNSPECIFIED WHETHER RHEUMATOID FACTOR PRESENT (HCC): ICD-10-CM

## 2021-10-26 DIAGNOSIS — Z00.00 ROUTINE GENERAL MEDICAL EXAMINATION AT A HEALTH CARE FACILITY: ICD-10-CM

## 2021-10-26 DIAGNOSIS — Z00.00 ROUTINE GENERAL MEDICAL EXAMINATION AT A HEALTH CARE FACILITY: Primary | ICD-10-CM

## 2021-10-26 LAB
BILIRUBIN, POC: NORMAL
BLOOD URINE, POC: NEGATIVE
CHOLESTEROL, TOTAL: 207 MG/DL (ref 0–199)
CLARITY, POC: CLEAR
COLOR, POC: NORMAL
GLUCOSE URINE, POC: NORMAL
HBA1C MFR BLD: 9.9 %
HCT VFR BLD CALC: 42 % (ref 34–48)
HDLC SERPL-MCNC: 50 MG/DL
HEMOGLOBIN: 13.5 G/DL (ref 11.5–15.5)
KETONES, POC: NORMAL
LDL CHOLESTEROL CALCULATED: 127 MG/DL (ref 0–99)
LEUKOCYTE EST, POC: NEGATIVE
MCH RBC QN AUTO: 27.1 PG (ref 26–35)
MCHC RBC AUTO-ENTMCNC: 32.1 % (ref 32–34.5)
MCV RBC AUTO: 84.3 FL (ref 80–99.9)
NITRITE, POC: NEGATIVE
PDW BLD-RTO: 14.4 FL (ref 11.5–15)
PH, POC: 5
PLATELET # BLD: 110 E9/L (ref 130–450)
PMV BLD AUTO: 10.4 FL (ref 7–12)
PROTEIN, POC: NORMAL
RBC # BLD: 4.98 E12/L (ref 3.5–5.5)
SPECIFIC GRAVITY, POC: >=1.03
TRIGL SERPL-MCNC: 151 MG/DL (ref 0–149)
TSH SERPL DL<=0.05 MIU/L-ACNC: 1.41 UIU/ML (ref 0.27–4.2)
UROBILINOGEN, POC: NORMAL
VITAMIN D 25-HYDROXY: 35 NG/ML (ref 30–100)
VLDLC SERPL CALC-MCNC: 30 MG/DL
WBC # BLD: 4.1 E9/L (ref 4.5–11.5)

## 2021-10-26 PROCEDURE — 82044 UR ALBUMIN SEMIQUANTITATIVE: CPT | Performed by: NURSE PRACTITIONER

## 2021-10-26 PROCEDURE — 83036 HEMOGLOBIN GLYCOSYLATED A1C: CPT | Performed by: NURSE PRACTITIONER

## 2021-10-26 PROCEDURE — G8417 CALC BMI ABV UP PARAM F/U: HCPCS | Performed by: NURSE PRACTITIONER

## 2021-10-26 PROCEDURE — G8484 FLU IMMUNIZE NO ADMIN: HCPCS | Performed by: NURSE PRACTITIONER

## 2021-10-26 PROCEDURE — G8427 DOCREV CUR MEDS BY ELIG CLIN: HCPCS | Performed by: NURSE PRACTITIONER

## 2021-10-26 PROCEDURE — 3046F HEMOGLOBIN A1C LEVEL >9.0%: CPT | Performed by: NURSE PRACTITIONER

## 2021-10-26 PROCEDURE — 99214 OFFICE O/P EST MOD 30 MIN: CPT | Performed by: NURSE PRACTITIONER

## 2021-10-26 PROCEDURE — 81002 URINALYSIS NONAUTO W/O SCOPE: CPT | Performed by: NURSE PRACTITIONER

## 2021-10-26 PROCEDURE — 2022F DILAT RTA XM EVC RTNOPTHY: CPT | Performed by: NURSE PRACTITIONER

## 2021-10-26 PROCEDURE — 96372 THER/PROPH/DIAG INJ SC/IM: CPT | Performed by: NURSE PRACTITIONER

## 2021-10-26 PROCEDURE — 1036F TOBACCO NON-USER: CPT | Performed by: NURSE PRACTITIONER

## 2021-10-26 RX ORDER — BLOOD-GLUCOSE METER
1 KIT MISCELLANEOUS DAILY
Qty: 1 KIT | Refills: 0 | Status: SHIPPED | OUTPATIENT
Start: 2021-10-26

## 2021-10-26 RX ORDER — KETOROLAC TROMETHAMINE 30 MG/ML
30 INJECTION, SOLUTION INTRAMUSCULAR; INTRAVENOUS ONCE
Status: COMPLETED | OUTPATIENT
Start: 2021-10-26 | End: 2021-10-26

## 2021-10-26 RX ADMIN — KETOROLAC TROMETHAMINE 30 MG: 30 INJECTION, SOLUTION INTRAMUSCULAR; INTRAVENOUS at 09:41

## 2021-10-26 SDOH — ECONOMIC STABILITY: FOOD INSECURITY: WITHIN THE PAST 12 MONTHS, YOU WORRIED THAT YOUR FOOD WOULD RUN OUT BEFORE YOU GOT MONEY TO BUY MORE.: NEVER TRUE

## 2021-10-26 SDOH — ECONOMIC STABILITY: FOOD INSECURITY: WITHIN THE PAST 12 MONTHS, THE FOOD YOU BOUGHT JUST DIDN'T LAST AND YOU DIDN'T HAVE MONEY TO GET MORE.: NEVER TRUE

## 2021-10-26 ASSESSMENT — PATIENT HEALTH QUESTIONNAIRE - PHQ9
2. FEELING DOWN, DEPRESSED OR HOPELESS: 0
1. LITTLE INTEREST OR PLEASURE IN DOING THINGS: 0
SUM OF ALL RESPONSES TO PHQ QUESTIONS 1-9: 0
SUM OF ALL RESPONSES TO PHQ QUESTIONS 1-9: 0
SUM OF ALL RESPONSES TO PHQ9 QUESTIONS 1 & 2: 0
SUM OF ALL RESPONSES TO PHQ QUESTIONS 1-9: 0

## 2021-10-26 ASSESSMENT — SOCIAL DETERMINANTS OF HEALTH (SDOH): HOW HARD IS IT FOR YOU TO PAY FOR THE VERY BASICS LIKE FOOD, HOUSING, MEDICAL CARE, AND HEATING?: PATIENT DECLINED

## 2021-10-26 NOTE — PROGRESS NOTES
2021     Andrew Ley 54 y.o. female    : 1970    Chief Complaint:   [address] and Follow-Up from Hospital (patient has been to the [facility] twice for back spasms and unable to go to the bathroom. patient had 2 mri and blood work patient has follow up forms )       History of Present Illness:   Andrew Ley is a 54 y.o. female who presents to the office to establish care. Patient was seen at [facility] for abdominal pain and constipation x10 days. She has been taking bisacodyl and MiraLAX at home with improvement of symptoms. She states that symptoms have completely resolved. She has been increasing her fluid and fiber intake. She also has an additional complaint of of chronic midthoracic back pain. She has not seen pain management in the past.  She denies any bowel or urinary incontinence. She denies any paresthesias or weakness in the lower extremities. She denies any known trauma. She does follow with massage therapy which improves her symptoms. She does take NSAIDs at home with some improvement of symptoms. On review of blood work fasting blood sugar was elevated. A1c today is 9.9. She does not check her sugars at home or follow a specific diet. She has no history of diabetes mellitus type 2. She denies any polydipsia, polyphagia or polyuria. She is not up-to-date with her eye exam.  The patient is not up-to-date with health maintenance screenings. Previous lab work was reviewed. Past Medical History:     Past Medical History:   Diagnosis Date    Arthritis        Past Surgical History:   Procedure Laterality Date    ANKLE SURGERY      HYSTERECTOMY         History reviewed. No pertinent family history.     Social History     Tobacco Use    Smoking status: Never Smoker    Smokeless tobacco: Never Used   Substance Use Topics    Alcohol use: No    Drug use: No       Medications:     Current Outpatient Medications:     glucose monitoring (FREESTYLE FREEDOM) kit, 1 kit by Does not apply route daily, Disp: 1 kit, Rfl: 0    blood glucose monitor supplies, Alcohol Swabs, Lancets,  Test 2 times a day & as needed for symptoms of irregular blood glucose. Dispense sufficient amount for indicated testing frequency plus additional to accommodate PRN testing needs. , Disp: 100 each, Rfl: 0    SITagliptin (JANUVIA) 100 MG tablet, Take 1 tablet by mouth daily, Disp: 30 tablet, Rfl: 5    metFORMIN (GLUCOPHAGE) 500 MG tablet, Take 1 tablet by mouth 2 times daily (with meals) X 2 weeks, if tolerating, take 2 tablets by mouth in the morning and 1 tablet by mouth in the afternoon (with meals) X 2 weeks, if tolerating take 2 tablets by mouth twice daily (with meals), Disp: 120 tablet, Rfl: 3    No Known Allergies    Review of Systems:   Review of Systems   Constitutional: Negative for activity change, appetite change, fatigue, fever and unexpected weight change. HENT: Negative for congestion, facial swelling, mouth sores, postnasal drip, rhinorrhea, sneezing, tinnitus and voice change. Eyes: Negative. Respiratory: Negative for apnea, cough, chest tightness, shortness of breath and wheezing. Cardiovascular: Negative for chest pain, palpitations and leg swelling. Gastrointestinal: Negative for abdominal distention, abdominal pain, constipation, diarrhea, nausea and vomiting. Endocrine: Negative for cold intolerance and heat intolerance. Genitourinary: Negative for difficulty urinating, dysuria, flank pain, frequency, pelvic pain and urgency. Musculoskeletal: Positive for back pain. Negative for gait problem, joint swelling, myalgias and neck pain. Skin: Negative for color change, pallor, rash and wound. Allergic/Immunologic: Negative for environmental allergies and food allergies. Neurological: Negative for dizziness, tremors, seizures, syncope, facial asymmetry, speech difficulty, weakness, light-headedness, numbness and headaches. Psychiatric/Behavioral: Negative for agitation, behavioral problems, confusion, decreased concentration, dysphoric mood, sleep disturbance and suicidal ideas. The patient is not hyperactive. Physical Exam:   Vital Signs:  /84   Pulse 105   Resp 16   Ht 5' 2\" (1.575 m)   Wt 179 lb (81.2 kg)   SpO2 98%   BMI 32.74 kg/m²    Oxygen Saturation Interpretation: Normal.  Physical Exam  Vitals and nursing note reviewed. Constitutional:       Appearance: Normal appearance. She is obese. HENT:      Head: Normocephalic and atraumatic. Right Ear: Tympanic membrane, ear canal and external ear normal.      Left Ear: Tympanic membrane, ear canal and external ear normal.      Nose: Nose normal.      Mouth/Throat:      Mouth: Mucous membranes are moist.      Pharynx: Oropharynx is clear. Eyes:      Extraocular Movements: Extraocular movements intact. Conjunctiva/sclera: Conjunctivae normal.      Pupils: Pupils are equal, round, and reactive to light. Cardiovascular:      Rate and Rhythm: Normal rate and regular rhythm. Pulses: Normal pulses. Heart sounds: Normal heart sounds. Pulmonary:      Effort: Pulmonary effort is normal.      Breath sounds: Normal breath sounds. No wheezing, rhonchi or rales. Abdominal:      General: Bowel sounds are normal. There is no distension. Palpations: Abdomen is soft. Tenderness: There is no abdominal tenderness. There is no rebound. Musculoskeletal:         General: Normal range of motion. Cervical back: Normal range of motion and neck supple. Thoracic back: Tenderness present. No swelling, edema, spasms or bony tenderness. Normal range of motion. Comments: Minimal paraspinal tenderness. No midline tenderness. Skin:     General: Skin is warm and dry. Capillary Refill: Capillary refill takes less than 2 seconds. Neurological:      General: No focal deficit present.       Mental Status: She is alert and oriented to person, place, and time. Psychiatric:         Mood and Affect: Mood normal.         Behavior: Behavior normal.         Thought Content: Thought content normal.         Judgment: Judgment normal.         Health Maintenance:     Health Maintenance   Topic Date Due    Hepatitis C screen  Never done    Pneumococcal 0-64 years Vaccine (1 of 2 - PPSV23) Never done    Diabetic foot exam  Never done    Diabetic retinal exam  Never done    COVID-19 Vaccine (1) Never done    HIV screen  Never done    Diabetic microalbuminuria test  Never done    Hepatitis B vaccine (1 of 3 - Risk 3-dose series) Never done    DTaP/Tdap/Td vaccine (1 - Tdap) Never done    Cervical cancer screen  Never done    Colon cancer screen colonoscopy  Never done    Flu vaccine (1) Never done    A1C test (Diabetic or Prediabetic)  01/26/2022    Lipid screen  10/26/2022    Hepatitis A vaccine  Aged Out    Hib vaccine  Aged Out    Meningococcal (ACWY) vaccine  Aged Out          There is no immunization history on file for this patient. Testing: All laboratory and radiology results have been personally reviewed by myself. Labs:  Results for orders placed or performed in visit on 10/26/21   POCT Urinalysis no Micro   Result Value Ref Range    Color, UA orange     Clarity, UA clear     Glucose, UA POC 500mg/dl     Bilirubin, UA small     Ketones, UA trace     Spec Grav, UA >=1.030     Blood, UA POC negative     pH, UA 5.0     Protein, UA POC 100mg/dl     Urobilinogen, UA 1.0e.u/dl     Leukocytes, UA negative     Nitrite, UA negative    POCT glycosylated hemoglobin (Hb A1C)   Result Value Ref Range    Hemoglobin A1C 9.9 %        Imaging: All Radiology results interpreted by Radiologist unless otherwise noted. No results found. Assessment/Plan:   I personally reviewed the patient's allergies, past medical history, medications, and vitals sign.       Abbie Quiros was seen today for establish care and follow-up from hospital.    Diagnoses and all orders for this visit:    Routine general medical examination at a health care facility  -     POCT Urinalysis no Micro  -     POCT glycosylated hemoglobin (Hb A1C)  -     Vitamin D 25 Hydroxy; Future  -     CBC; Future  -     Culture, Urine; Future  -     TSH without Reflex; Future  -     Lipid Panel; Future    Type 2 diabetes mellitus with hyperglycemia, without long-term current use of insulin ([facility])  -     glucose monitoring (FREESTYLE FREEDOM) kit; 1 kit by Does not apply route daily  -     blood glucose monitor supplies; Alcohol Swabs, Lancets,  Test 2 times a day & as needed for symptoms of irregular blood glucose. Dispense sufficient amount for indicated testing frequency plus additional to accommodate PRN testing needs.  -     SITagliptin (JANUVIA) 100 MG tablet; Take 1 tablet by mouth daily  -     metFORMIN (GLUCOPHAGE) 500 MG tablet; Take 1 tablet by mouth 2 times daily (with meals) X 2 weeks, if tolerating, take 2 tablets by mouth in the morning and 1 tablet by mouth in the afternoon (with meals) X 2 weeks, if tolerating take 2 tablets by mouth twice daily (with meals)  -     POCT microalbumin    Rheumatoid arthritis, involving unspecified site, unspecified whether rheumatoid factor present ([facility] [address]    Acute bilateral thoracic back pain  -     [facility] - Sandra Moreno MD, Pain Medicine, [place]  -     ketorolac (TORADOL) injection 30 mg    BMI 32.0-32.9,adult  -     Vitamin D 25 Hydroxy; Future    Elevated glucose  -     POCT glycosylated hemoglobin (Hb A1C)    Screening for colon cancer  -     Cologamaya (For External Results Only); Future        UA in office was negative it did show glucose, small amount of bilirubin and protein. A1c is 9.9. She is newly diabetic. Extensive education was provided. We will start the patient on Metformin and Januvia. Patient to be checking her sugars at least once daily fasting. She is to call in 2 weeks with blood sugar readings.   Toradol injection given

## 2021-10-28 DIAGNOSIS — D69.6 THROMBOCYTOPENIA (HCC): Primary | ICD-10-CM

## 2021-10-29 ENCOUNTER — TELEPHONE (OUTPATIENT)
Dept: PRIMARY CARE CLINIC | Age: 51
End: 2021-10-29

## 2021-10-29 LAB — URINE CULTURE, ROUTINE: NORMAL

## 2021-10-29 NOTE — TELEPHONE ENCOUNTER
----- Message from Romlaura Alberto sent at 10/28/2021  9:07 AM EDT -----  Subject: Message to Provider    QUESTIONS  Information for Provider? Patient states she is bloating throughout the   day, and at night it gets worse. States her stomach gets as hard as a   rock. States she cannot even pass gas to try to relieve the pain. Patient   would like to know what she can do to help the bloating please. Pt would   like a call back as soon as possible. Thank you.   ---------------------------------------------------------------------------  --------------  CALL BACK INFO  What is the best way for the office to contact you? Do not leave any   message, patient will call back for answer  Preferred Call Back Phone Number? [phone]  ---------------------------------------------------------------------------  --------------  SCRIPT ANSWERS  Relationship to Patient?  Self

## 2021-11-01 DIAGNOSIS — R14.0 ABDOMINAL BLOATING: Primary | ICD-10-CM

## 2021-11-01 ASSESSMENT — ENCOUNTER SYMPTOMS
NAUSEA: 0
WHEEZING: 0
RHINORRHEA: 0
COLOR CHANGE: 0
DIARRHEA: 0
SHORTNESS OF BREATH: 0
CHEST TIGHTNESS: 0
BACK PAIN: 1
ABDOMINAL PAIN: 0
CONSTIPATION: 0
COUGH: 0
ABDOMINAL DISTENTION: 0
VOICE CHANGE: 0
EYES NEGATIVE: 1
FACIAL SWELLING: 0
APNEA: 0
VOMITING: 0

## 2021-11-01 NOTE — TELEPHONE ENCOUNTER
Pt called asking if there is anything you can give her for bloating and constipation she has not had a bowel movement since last Tuesday and takes miralax and and stool softeners daily.  I did tell her she should be evaluated to make sure nothing further is going on she said she cant today but needs something for this

## 2021-11-01 NOTE — TELEPHONE ENCOUNTER
Patient states that she is passing gas,she states that she is just very bloated especially at night and it pulls on her back. She is unable to come in today but will try for tomorrow.

## 2021-11-01 NOTE — TELEPHONE ENCOUNTER
If she is not passing gas as well and has not had a BM, there is a possibility that she has an obstruction and needs to be evaluated. If she can not come into the office, she needs to obtain KUB, I did place this order. If symptoms are worsening though, she should be evaluated in walk in or the ER. I can't call in medication until imaging is reviewed.

## 2021-11-02 ENCOUNTER — OFFICE VISIT (OUTPATIENT)
Dept: FAMILY MEDICINE CLINIC | Age: 51
End: 2021-11-02
Payer: COMMERCIAL

## 2021-11-02 VITALS
OXYGEN SATURATION: 98 % | BODY MASS INDEX: 30.18 KG/M2 | TEMPERATURE: 96.7 F | DIASTOLIC BLOOD PRESSURE: 98 MMHG | WEIGHT: 165 LBS | SYSTOLIC BLOOD PRESSURE: 142 MMHG | HEART RATE: 104 BPM

## 2021-11-02 DIAGNOSIS — R10.84 GENERALIZED ABDOMINAL PAIN: Primary | ICD-10-CM

## 2021-11-02 DIAGNOSIS — K59.00 CONSTIPATION, UNSPECIFIED CONSTIPATION TYPE: ICD-10-CM

## 2021-11-02 DIAGNOSIS — R63.0 DECREASED APPETITE: ICD-10-CM

## 2021-11-02 PROCEDURE — 99204 OFFICE O/P NEW MOD 45 MIN: CPT | Performed by: PHYSICIAN ASSISTANT

## 2021-11-02 PROCEDURE — G8417 CALC BMI ABV UP PARAM F/U: HCPCS | Performed by: PHYSICIAN ASSISTANT

## 2021-11-02 PROCEDURE — G8484 FLU IMMUNIZE NO ADMIN: HCPCS | Performed by: PHYSICIAN ASSISTANT

## 2021-11-02 PROCEDURE — G8427 DOCREV CUR MEDS BY ELIG CLIN: HCPCS | Performed by: PHYSICIAN ASSISTANT

## 2021-11-02 PROCEDURE — 1036F TOBACCO NON-USER: CPT | Performed by: PHYSICIAN ASSISTANT

## 2021-11-02 RX ORDER — BLOOD-GLUCOSE METER
KIT MISCELLANEOUS
COMMUNITY
Start: 2021-10-26 | End: 2022-01-25

## 2021-11-02 RX ORDER — LANCETS 28 GAUGE
EACH MISCELLANEOUS
COMMUNITY
Start: 2021-10-26

## 2021-11-02 ASSESSMENT — ENCOUNTER SYMPTOMS
CONSTIPATION: 1
COUGH: 0
VOMITING: 0
BACK PAIN: 0
ABDOMINAL PAIN: 1
ABDOMINAL DISTENTION: 1
SHORTNESS OF BREATH: 0
DIARRHEA: 0
SORE THROAT: 0
PHOTOPHOBIA: 0
NAUSEA: 0

## 2021-11-02 NOTE — PROGRESS NOTES
21  Pro Krause : 1970 Sex: female  Age 54 y.o. Subjective:  Chief Complaint   Patient presents with    Abdominal Pain     bloating     Constipation         80-year-old female with a history of type 2 diabetes, rheumatoid arthritis and back pain presents to the walk-in clinic for evaluation of abdominal distention, pain, bloating and constipation. She states that she has not had a bowel movement since last week on Tuesday. Patient states that she is able to pass gas occasionally. She states that night her symptoms seem to worsen. She states that she has had a decreased appetite due to her symptoms. She states her entire abdomen feels tight as though someone is wrapping a belt around her abdomen and pulling backwards. She denies any urinary complaints. She has had a past hysterectomy but denies any other abdominal surgeries. She denies vomiting. No fever or chills. She denies any new medications. she takes Metformin for type 2 diabetes. Patient states that she has tried over-the-counter Ex-Lax as well as magnesium citrate without any relief. Review of Systems   Constitutional: Positive for appetite change. Negative for chills and fever. HENT: Negative for congestion, ear pain and sore throat. Eyes: Negative for photophobia and visual disturbance. Respiratory: Negative for cough and shortness of breath. Cardiovascular: Negative for chest pain. Gastrointestinal: Positive for abdominal distention, abdominal pain and constipation. Negative for diarrhea, nausea and vomiting. Genitourinary: Negative for difficulty urinating, dysuria, frequency and urgency. Musculoskeletal: Negative for back pain, neck pain and neck stiffness. Skin: Negative for rash. Neurological: Negative for dizziness, syncope, weakness, light-headedness and headaches. Hematological: Negative for adenopathy. Does not bruise/bleed easily.    Psychiatric/Behavioral: Negative for agitation and confusion. All other systems reviewed and are negative. PMH:     Past Medical History:   Diagnosis Date    Arthritis        Past Surgical History:   Procedure Laterality Date    ANKLE SURGERY      HYSTERECTOMY         History reviewed. No pertinent family history. Medications:     Current Outpatient Medications:     FREESTYLE LITE strip, , Disp: , Rfl:     FreeStyle Lancets MISC, , Disp: , Rfl:     glucose monitoring (FREESTYLE FREEDOM) kit, 1 kit by Does not apply route daily, Disp: 1 kit, Rfl: 0    blood glucose monitor supplies, Alcohol Swabs, Lancets,  Test 2 times a day & as needed for symptoms of irregular blood glucose. Dispense sufficient amount for indicated testing frequency plus additional to accommodate PRN testing needs. , Disp: 100 each, Rfl: 0    SITagliptin (JANUVIA) 100 MG tablet, Take 1 tablet by mouth daily, Disp: 30 tablet, Rfl: 5    metFORMIN (GLUCOPHAGE) 500 MG tablet, Take 1 tablet by mouth 2 times daily (with meals) X 2 weeks, if tolerating, take 2 tablets by mouth in the morning and 1 tablet by mouth in the afternoon (with meals) X 2 weeks, if tolerating take 2 tablets by mouth twice daily (with meals), Disp: 120 tablet, Rfl: 3    Allergies:   No Known Allergies    Social History:     Social History     Tobacco Use    Smoking status: Never Smoker    Smokeless tobacco: Never Used   Substance Use Topics    Alcohol use: No    Drug use: No       Patient lives at home. Physical Exam:     Vitals:    11/02/21 0823 11/02/21 0825   BP: (!) 142/98 (!) 142/98   Pulse: 104    Temp: 96.7 °F (35.9 °C)    TempSrc: Temporal    SpO2: 98%    Weight: 165 lb (74.8 kg)        Exam:  Physical Exam  Vitals and nursing note reviewed. Constitutional:       General: She is not in acute distress. Appearance: She is well-developed. HENT:      Head: Normocephalic and atraumatic.       Right Ear: Tympanic membrane normal.      Left Ear: Tympanic membrane normal.      Mouth/Throat: Mouth: Mucous membranes are moist.      Pharynx: Oropharynx is clear. Eyes:      Conjunctiva/sclera: Conjunctivae normal.      Pupils: Pupils are equal, round, and reactive to light. Cardiovascular:      Rate and Rhythm: Normal rate and regular rhythm. Pulmonary:      Effort: Pulmonary effort is normal. No respiratory distress. Breath sounds: Normal breath sounds. Abdominal:      General: Bowel sounds are normal. There is distension. Palpations: Abdomen is soft. Tenderness: There is abdominal tenderness. Comments: Generalized abdominal discomfort with palpation. She also has psoriasis noted to the abdomen. Musculoskeletal:         General: Normal range of motion. Cervical back: Normal range of motion. Skin:     General: Skin is warm and dry. Findings: Rash present. Comments: Psoriasis   Neurological:      General: No focal deficit present. Mental Status: She is alert and oriented to person, place, and time. Psychiatric:         Mood and Affect: Mood normal.         Behavior: Behavior normal.         Thought Content: Thought content normal.         Judgment: Judgment normal.           Testing:           Medical Decision Making:     Patient upon arrival did not appear toxic or lethargic. Vital signs were reviewed. Past medical history reviewed. Allergies reviewed. Medications reviewed. Patient is presenting with the above complaint of abdominal distention, pain and discomfort with constipation. Differential diagnosis was discussed with the patient. We discussed her limitations of express care and the need for more emergent evaluation the emergency department. Patient has tried multiple outpatient remedies for constipation without any success in having a bowel movement. Patient does understand our concern and is agreeable to go to the emergency department. She states that at this time she does not know where she will go.   She does

## 2021-11-15 ENCOUNTER — OFFICE VISIT (OUTPATIENT)
Dept: PAIN MANAGEMENT | Age: 51
End: 2021-11-15
Payer: COMMERCIAL

## 2021-11-15 ENCOUNTER — HOSPITAL ENCOUNTER (OUTPATIENT)
Dept: GENERAL RADIOLOGY | Age: 51
Discharge: HOME | End: 2021-11-17
Payer: COMMERCIAL

## 2021-11-15 ENCOUNTER — HOSPITAL ENCOUNTER (OUTPATIENT)
Age: 51
Discharge: HOME | End: 2021-11-17
Payer: COMMERCIAL

## 2021-11-15 VITALS
WEIGHT: 162 LBS | RESPIRATION RATE: 16 BRPM | BODY MASS INDEX: 29.81 KG/M2 | OXYGEN SATURATION: 98 % | SYSTOLIC BLOOD PRESSURE: 126 MMHG | HEIGHT: 62 IN | TEMPERATURE: 94.8 F | DIASTOLIC BLOOD PRESSURE: 82 MMHG | HEART RATE: 98 BPM

## 2021-11-15 DIAGNOSIS — M51.36 DDD (DEGENERATIVE DISC DISEASE), LUMBAR: ICD-10-CM

## 2021-11-15 DIAGNOSIS — M54.14 RADICULAR PAIN OF THORACIC REGION: ICD-10-CM

## 2021-11-15 DIAGNOSIS — M47.814 THORACIC SPONDYLOSIS: ICD-10-CM

## 2021-11-15 DIAGNOSIS — M51.34 THORACIC DEGENERATIVE DISC DISEASE: ICD-10-CM

## 2021-11-15 DIAGNOSIS — M47.817 LUMBOSACRAL SPONDYLOSIS WITHOUT MYELOPATHY: ICD-10-CM

## 2021-11-15 DIAGNOSIS — M51.34 THORACIC DEGENERATIVE DISC DISEASE: Primary | ICD-10-CM

## 2021-11-15 PROCEDURE — 99204 OFFICE O/P NEW MOD 45 MIN: CPT | Performed by: ANESTHESIOLOGY

## 2021-11-15 PROCEDURE — G8427 DOCREV CUR MEDS BY ELIG CLIN: HCPCS | Performed by: ANESTHESIOLOGY

## 2021-11-15 PROCEDURE — G8484 FLU IMMUNIZE NO ADMIN: HCPCS | Performed by: ANESTHESIOLOGY

## 2021-11-15 PROCEDURE — 72110 X-RAY EXAM L-2 SPINE 4/>VWS: CPT

## 2021-11-15 PROCEDURE — 72070 X-RAY EXAM THORAC SPINE 2VWS: CPT

## 2021-11-15 PROCEDURE — 1036F TOBACCO NON-USER: CPT | Performed by: ANESTHESIOLOGY

## 2021-11-15 PROCEDURE — G8417 CALC BMI ABV UP PARAM F/U: HCPCS | Performed by: ANESTHESIOLOGY

## 2021-11-15 RX ORDER — TIZANIDINE 4 MG/1
4 TABLET ORAL DAILY
COMMUNITY
End: 2021-11-15 | Stop reason: SDUPTHER

## 2021-11-15 RX ORDER — NABUMETONE 750 MG/1
750 TABLET, FILM COATED ORAL 2 TIMES DAILY PRN
Qty: 60 TABLET | Refills: 0 | Status: SHIPPED
Start: 2021-11-15 | End: 2021-12-23 | Stop reason: SDUPTHER

## 2021-11-15 RX ORDER — TIZANIDINE 4 MG/1
4 TABLET ORAL DAILY PRN
Qty: 30 TABLET | Refills: 1 | Status: SHIPPED
Start: 2021-11-15 | End: 2021-12-23

## 2021-11-15 NOTE — PROGRESS NOTES
[facility]      [place], [address]  Dept: [phone]          Consult Note      Patient:  Clyde Magana,  1970    Date of Service:  11/15/21     Requesting Physician:  RENNY Jacobo NP    Reason for Consult:      Patient presents with complaints of thoracic pain    HISTORY OF PRESENT ILLNESS:      Ms. Clyde Magana is a 54 y.o. female presented today to [facility] for evaluation of  Thoracic back pain and left chest wall pain. Noticed back pain for over a month- and intermittent chest wall pain left > right- tingling/ numbness of the chest wall. Associated with muscle cramps/ abdominal bloating. Has been evaluated in the ER had CT abdomen/ pelvis. Pain is intermittent and is described as sharp, stabbing and throbbing. Patient does not have new bladder or bowel dysfunction. Alleviating factors include: rest.  Aggravating factors include: movement, bending, lifting. Pain causes functional limitations/ limits Adl's : Yes     Nursing notes and details of the pain history reviewed. Please see intake notes for details. NOTE:  H/o uterine cancer- s/p hysterotomy/ chemotherapy/ RT- stable now. Previous treatments:   Massotherapy/ HEP     Medications: - NSAID's : yes             - Membrane stabilizers : no            - Opioids : no            - Adjuvants or Others : yes, muscle relaxants. TENS Unit: yes    Surgeries: no Spine surgeries    She has not been on anticoagulation medications     She has not been on herbal supplements. She is diabetic.     H/O Smoking: no  H/O alcohol abuse : no  H/O Illicit drug use : denies- occasion CBD oil    Employment: employed- works with [employer]     Imaging:     CT abdomen/ pelvis: 10/6/2021:  Impression   Findings again most compatible with constipation, slightly more prominent in   the transverse colon than comparison       Scattered slight diverticulosis again without CT evidence of diverticulitis.       Mild hepatic steatosis again suggested       Very small fat containing ventral hernia above the umbilicus       Status post hysterectomy       Past Medical History:   Diagnosis Date    Arthritis     [facility] [address]     Diabetes mellitus ([facility] [address]        Past Surgical History:   Procedure Laterality Date    ANKLE SURGERY      HYSTERECTOMY         Prior to Admission medications    Medication Sig Start Date End Date Taking? Authorizing Provider   tiZANidine (ZANAFLEX) 4 MG tablet Take 4 mg by mouth daily   Yes Historical Provider, MD   FREESTYLE LITE strip  10/26/21  Yes Historical Provider, MD   FreeStyle Lancets [address] [facility]  10/26/21  Yes Historical Provider, MD   glucose monitoring (FREESTYLE FREEDOM) kit 1 kit by Does not apply route daily 10/26/21  Yes RENNY Chakraborty NP   blood glucose monitor supplies Alcohol Swabs, Lancets,  Test 2 times a day & as needed for symptoms of irregular blood glucose. Dispense sufficient amount for indicated testing frequency plus additional to accommodate PRN testing needs.  10/26/21  Yes RENNY Chakraborty NP   SITagliptin (JANUVIA) 100 MG tablet Take 1 tablet by mouth daily 10/26/21  Yes RENNY Chakraborty NP   metFORMIN (GLUCOPHAGE) 500 MG tablet Take 1 tablet by mouth 2 times daily (with meals) X 2 weeks, if tolerating, take 2 tablets by mouth in the morning and 1 tablet by mouth in the afternoon (with meals) X 2 weeks, if tolerating take 2 tablets by mouth twice daily (with meals) 10/26/21  Yes RENNY Chakraborty NP       No Known Allergies    Social History     Socioeconomic History    Marital status: Single     Spouse name: Not on file    Number of children: Not on file    Years of education: Not on file    Highest education level: Not on file   Occupational History    Not on file   Tobacco Use    Smoking status: Never Smoker    Smokeless tobacco: Never Used   Substance and Sexual Activity    Alcohol use: No    Drug use: No    Sexual activity: Not on file   Other Topics Concern    Not on file   Social History Narrative    Not on file     Social Determinants of Health     Financial Resource Strain: Unknown    Difficulty of Paying Living Expenses: Patient refused   Food Insecurity: No Food Insecurity    Worried About Running Out of Food in the Last Year: Never true    Erik of Food in the Last Year: Never true   Transportation Needs:     Lack of Transportation (Medical): Not on file    Lack of Transportation (Non-Medical): Not on file   Physical Activity:     Days of Exercise per Week: Not on file    Minutes of Exercise per Session: Not on file   Stress:     Feeling of Stress : Not on file   Social Connections:     Frequency of Communication with Friends and Family: Not on file    Frequency of Social Gatherings with Friends and Family: Not on file    Attends Faith Services: Not on file    Active Member of [address] [place] [address] [employer] or Organizations: Not on file    Attends Club or Organization Meetings: Not on file    Marital Status: Not on file   Intimate Partner Violence:     Fear of Current or Ex-Partner: Not on file    Emotionally Abused: Not on file    Physically Abused: Not on file    Sexually Abused: Not on file   Housing Stability:     Unable to Pay for Housing in the Last Year: Not on file    Number of [place] in the Last Year: Not on file    Unstable Housing in the Last Year: Not on file       Family History   Problem Relation Age of Onset    Cancer Mother     Cancer Father        REVIEW OF SYSTEMS:     Patient specifically denies fever/chills, chest pain, shortness of breath, new bowel or bladder complaints. All other review of systems was negative. Review of Systems -documented reviewed.     PHYSICAL EXAMINATION:      /82   Pulse 98   Temp 94.8 °F (34.9 °C) (Infrared)   Resp 16   Ht 5' 2\" (1.575 m)   Wt 162 lb (73.5 kg)   SpO2 98%   BMI 29.63 kg/m²     General:      General appearance:  Pleasant and well-hydrated, in no distress and A & O x 3  Build:Overweight  Function: Rises from seated position easily and Moves about room without difficulty    HEENT:    Head:normocephalic, atraumatic  Pupils:regular, round, equal  Sclera: icterus absent    Lungs:    Breathing:normal breathing pattern     CVS:     RRR    Abdomen:    Shape:non-distended and normal  Tenderness:none  Guarding:none    Cervical spine:    Inspection:normal  Palpation:tenderness paravertebral muscles, tenderness trapezium, left, right -no  Range of motion:Normal flexion, extension, rotation bilaterally and is not painful. Spurling's: negative bilaterally    Thoracic spine:     Spine inspection:normal   Palpation:Yes tenderness over the  paraspinal area +, left  Range of motion:normal in flexion, extension rotation bilateral and is not painful. Lumbar spine:    Spine inspection: Normal   Palpation: Tenderness paravertebral muscles Yes bilaterally  Range of motion: Decreased, flexion Decreased, Lateral bending, extension and rotation bilaterally reduced is somewhat painful. Sacroiliac joint tenderness No bilaterally  SLR : negative bilaterally    Musculoskeletal:    Trigger points no    Extremities:    Tremors:None bilaterally upper and lower  Edema:none x all 4 extremities    Neurological:    Sensory: Normal to light touch     Motor:   Right  5/5              Left  5/5               Right Bicep 5/5           Left Bicep 5/5              Right Triceps 5/5       Left Triceps 5/5          Right Deltoid 5/5     Left Deltoid 5/5                  Right Quadriceps 5/5          Left Quadriceps 5/5           Right Gastrocnemius 5/5    Left Gastrocnemius 5/5  Right Ant Tibialis 5/5  Left Ant Tibialis 5/5    Reflexes:    B/l equal    Gait:normal Yes    Dermatology:    Skin:psoriatic lesions +    Assessment/Plan:     Diagnosis Orders   1. Thoracic degenerative disc disease     2. Thoracic spondylosis     3. DDD (degenerative disc disease), lumbar     4.  Lumbosacral spondylosis without myelopathy     5. Radicular pain of thoracic region         54 y.o. female with h/o recent onset back pain and thoracic radicular pain. Intermittent muscle spasms +    Muscle relaxants helps. Past h/o uterine cancer- s/p surgery/ Chemo/ RT- currently stable. No red flag signs/ symptoms. PLAN:  Xray thoracic spine     X ray Lumbar spine    Relafen for prn use - 750 mg po bid prn. Zanaflex po bid prn. Physical therapy. Recommend MRI of thoraicc spine / LS spine- patient declined - due to co-pay issues. F/U in 4-6 weeks    Counseling :    Patient encouraged to stay active and to watch/lose weight    Encouraged to continue Regular home exercise program as tolerated - stretching / strengthening. Treatment plan discussed with the patient including medication and procedure side effects. Controlled Substances Monitoring:     OARRS reviewed- not on chronic opioids. Nesha Hurd MD    Dear Ms. Orion Miranda,   Thank you for referring Ms. Abigail Weathers and allowing us to participate in her care. Please do not hesitate to contact me if you have any questions regarding her care.     Rita Aleman MD    CC:    Clarissa Martines, APRN - NP  [id number],  [address] [place] [address]

## 2021-11-23 DIAGNOSIS — Z12.11 SCREENING FOR COLON CANCER: ICD-10-CM

## 2021-12-23 ENCOUNTER — OFFICE VISIT (OUTPATIENT)
Dept: PAIN MANAGEMENT | Age: 51
End: 2021-12-23
Payer: COMMERCIAL

## 2021-12-23 VITALS
TEMPERATURE: 97.8 F | WEIGHT: 162 LBS | HEIGHT: 62 IN | OXYGEN SATURATION: 98 % | RESPIRATION RATE: 16 BRPM | BODY MASS INDEX: 29.81 KG/M2 | HEART RATE: 78 BPM | DIASTOLIC BLOOD PRESSURE: 82 MMHG | SYSTOLIC BLOOD PRESSURE: 130 MMHG

## 2021-12-23 DIAGNOSIS — M51.34 THORACIC DEGENERATIVE DISC DISEASE: Primary | ICD-10-CM

## 2021-12-23 DIAGNOSIS — M47.817 LUMBOSACRAL SPONDYLOSIS WITHOUT MYELOPATHY: ICD-10-CM

## 2021-12-23 DIAGNOSIS — M51.36 DDD (DEGENERATIVE DISC DISEASE), LUMBAR: ICD-10-CM

## 2021-12-23 DIAGNOSIS — M47.814 THORACIC SPONDYLOSIS: ICD-10-CM

## 2021-12-23 PROCEDURE — G8427 DOCREV CUR MEDS BY ELIG CLIN: HCPCS | Performed by: ANESTHESIOLOGY

## 2021-12-23 PROCEDURE — 99213 OFFICE O/P EST LOW 20 MIN: CPT | Performed by: ANESTHESIOLOGY

## 2021-12-23 PROCEDURE — G8417 CALC BMI ABV UP PARAM F/U: HCPCS | Performed by: ANESTHESIOLOGY

## 2021-12-23 PROCEDURE — G8484 FLU IMMUNIZE NO ADMIN: HCPCS | Performed by: ANESTHESIOLOGY

## 2021-12-23 PROCEDURE — 1036F TOBACCO NON-USER: CPT | Performed by: ANESTHESIOLOGY

## 2021-12-23 RX ORDER — TIZANIDINE 2 MG/1
2 TABLET ORAL 2 TIMES DAILY PRN
Qty: 30 TABLET | Refills: 1 | Status: SHIPPED | OUTPATIENT
Start: 2021-12-23

## 2021-12-23 RX ORDER — NABUMETONE 750 MG/1
750 TABLET, FILM COATED ORAL 2 TIMES DAILY PRN
Qty: 60 TABLET | Refills: 1 | Status: SHIPPED | OUTPATIENT
Start: 2021-12-23

## 2021-12-23 NOTE — PROGRESS NOTES
Do you currently have any of the following:    Fever: No  Headache:  No  Cough: No  Shortness of breath: No  Exposed to anyone with these symptoms: No         Mandi Peng presents to the [address] [place] [address] on 12/23/2021. Gisselle Calixto is complaining of pain mid thoracic The pain is constant. The pain is described as dull. Pain is rated on her best day at a 2, on her worst day at a 7, and on average at a 2 on the VAS scale. She took her last dose of     Any procedures since your last visit:     Pacemaker or defibrillator: No managed by    She is not on NSAIDS and is not on anticoagulation medications to include none and is managed by     Medication Contract and Consent for Opioid Use Documents Filed      No documents found                /82   Pulse 78   Temp 97.8 °F (36.6 °C) (Infrared)   Resp 16   Ht 5' 2\" (1.575 m)   Wt 162 lb (73.5 kg)   SpO2 98%   BMI 29.63 kg/m²      No LMP recorded.

## 2021-12-23 NOTE — PROGRESS NOTES
[facility]   [place]  Dept: [phone]      Follow up Note      Osmar Sotelo     Date of Visit:  12/23/2021    CC:  Patient presents for follow up   Chief Complaint   Patient presents with    Follow-up     mid thoracic pain        HPI:  Thoracic back pain and left chest wall pain.     Noticed back pain for over a month- and intermittent chest wall pain left > right- tingling/ numbness of the chest wall.     Associated with muscle cramps/ abdominal bloating.     Has been evaluated in the ER had CT abdomen/ pelvis. Nursing notes and details of the pain history reviewed.  Please see intake notes for details.     NOTE:  H/o uterine cancer- s/p hysterotomy/ chemotherapy/ RT- stable now.     Previous treatments:   Massotherapy/ HEP      Medications: - NSAID's : yes                        - Membrane stabilizers : no                       - Opioids : no                       - Adjuvants or Others : yes, muscle relaxants.     TENS Unit: yes     Surgeries: no Spine surgeries     She has not been on anticoagulation medications      She has not been on herbal supplements.       She is diabetic.     H/O Smoking: no  H/O alcohol abuse : no  H/O Illicit drug use : denies- occasion CBD oil     Employment: employed- works with [employer]      Imaging:   X- ray thoracic spine and X- ray LS spine: 11/15/2021:  FINDINGS:   T-spine: Mild multilevel degenerative disc disease.  No fracture or   dislocation.  Very mild dextroscoliosis.  Normal soft tissues.       L-spine: Degenerative disc disease primarily L4-5.  Degenerative facet   arthropathy is most apparent at L4-5 bilaterally.  No fracture or   dislocation.  Normal soft tissues.           Impression   Degenerative spondylotic changes at the T-spine and L-spine as noted.  Mild   thoracic dextroscoliosis.        CT abdomen/ pelvis: 10/6/2021:  Impression   Findings again most compatible with constipation, slightly more prominent in   the transverse colon than comparison       Scattered slight diverticulosis again without CT evidence of diverticulitis.       Mild hepatic steatosis again suggested       Very small fat containing ventral hernia above the umbilicus       Status post hysterectomy       Potential Aberrant Drug-Related Behavior:  no    Urine Drug Screening:    OARRS report[de-identified]    Past Medical History:   Diagnosis Date    Arthritis     Cancer ([facility] [address] [facility]     Diabetes mellitus ([facility] [address] [facility]        Past Surgical History:   Procedure Laterality Date    ANKLE SURGERY      HYSTERECTOMY         Prior to Admission medications    Medication Sig Start Date End Date Taking? Authorizing Provider   tiZANidine (ZANAFLEX) 4 MG tablet Take 1 tablet by mouth daily as needed (musle spasm) 11/15/21  Yes Delma Rodríguez MD   FREESTYLE LITE strip  10/26/21  Yes Historical Provider, MD   FreeStyle Lancets [address] [facility] [place] [facility]  10/26/21  Yes Historical Provider, MD   glucose monitoring (FREESTYLE FREEDOM) kit 1 kit by Does not apply route daily 10/26/21  Yes RENNY Goins NP   blood glucose monitor supplies Alcohol Swabs, Lancets,  Test 2 times a day & as needed for symptoms of irregular blood glucose. Dispense sufficient amount for indicated testing frequency plus additional to accommodate PRN testing needs.  10/26/21  Yes RENNY Goins NP   SITagliptin (JANUVIA) 100 MG tablet Take 1 tablet by mouth daily 10/26/21  Yes RENNY Goins NP   metFORMIN (GLUCOPHAGE) 500 MG tablet Take 1 tablet by mouth 2 times daily (with meals) X 2 weeks, if tolerating, take 2 tablets by mouth in the morning and 1 tablet by mouth in the afternoon (with meals) X 2 weeks, if tolerating take 2 tablets by mouth twice daily (with meals) 10/26/21  Yes RENNY Goins NP   nabumetone (RELAFEN) 750 MG tablet Take 1 tablet by mouth 2 times daily as needed for Pain  Patient not taking: Reported on 12/23/2021 11/15/21   Delma Rodríguez MD       No Known Allergies    Social History     Socioeconomic History    Marital status: Single     Spouse name: Not on file    Number of children: Not on file    Years of education: Not on file    Highest education level: Not on file   Occupational History    Not on file   Tobacco Use    Smoking status: Never Smoker    Smokeless tobacco: Never Used   Substance and Sexual Activity    Alcohol use: No    Drug use: No    Sexual activity: Not on file   Other Topics Concern    Not on file   Social History Narrative    Not on file     Social Determinants of Health     Financial Resource Strain: Unknown    Difficulty of Paying Living Expenses: Patient refused   Food Insecurity: No Food Insecurity    Worried About Running Out of Food in the Last Year: Never true    Erik of Food in the Last Year: Never true   Transportation Needs:     Lack of Transportation (Medical): Not on file    Lack of Transportation (Non-Medical):  Not on file   Physical Activity:     Days of Exercise per Week: Not on file    Minutes of Exercise per Session: Not on file   Stress:     Feeling of Stress : Not on file   Social Connections:     Frequency of Communication with Friends and Family: Not on file    Frequency of Social Gatherings with Friends and Family: Not on file    Attends Rast[employer] Services: Not on file    Active Member of [address] [place] [address] [employer] or Organizations: Not on file    Attends Club or Organization Meetings: Not on file    Marital Status: Not on file   Intimate Partner Violence:     Fear of Current or Ex-Partner: Not on file    Emotionally Abused: Not on file    Physically Abused: Not on file    Sexually Abused: Not on file   Housing Stability:     Unable to Pay for Housing in the Last Year: Not on file    Number of [place] in the Last Year: Not on file    Unstable Housing in the Last Year: Not on file       Family History   Problem Relation Age of Onset    Cancer Mother     Cancer Father        REVIEW OF SYSTEMS:     Brad Hendrix denies fever/chills, chest pain, shortness of breath, new bowel or bladder complaints. All other review of systems was negative. PHYSICAL EXAMINATION:      /82   Pulse 78   Temp 97.8 °F (36.6 °C) (Infrared)   Resp 16   Ht 5' 2\" (1.575 m)   Wt 162 lb (73.5 kg)   SpO2 98%   BMI 29.63 kg/m²   General:       General appearance:  Pleasant and well-hydrated, in no distress and A & O x 3  Build:Overweight  Function: Rises from seated position easily and Moves about room without difficulty     HEENT:     Head:normocephalic, atraumatic  Pupils:regular, round, equal  Sclera: icterus absent     Lungs:     Breathing:normal breathing pattern      CVS:     RRR     Abdomen:     Shape:non-distended and normal    Cervical spine:     Inspection:normal     Thoracic spine:                Spine inspection:normal   Palpation:Yes tenderness over the  paraspinal area +, left  Range of motion:normal in flexion, extension rotation bilateral and is not painful.     Lumbar spine:     Spine inspection: Normal   Palpation: Tenderness paravertebral muscles Yes bilaterally  Range of motion: Decreased, flexion Decreased, Lateral bending, extension and rotation bilaterally reduced is somewhat painful. Sacroiliac joint tenderness No bilaterally  SLR : negative bilaterally     Musculoskeletal:     Trigger points no     Extremities:     Tremors:None bilaterally upper and lower  Edema:none x all 4 extremities     Neurological:     Sensory: Normal to light touch      Motor:   No new changes. Gait:normal Yes     Dermatology:     Skin: psoriatic lesions +    Assessment/Plan:    Diagnosis Orders   1. Thoracic degenerative disc disease      2. Thoracic spondylosis      3. DDD (degenerative disc disease), lumbar      4. Lumbosacral spondylosis without myelopathy      5.  Radicular pain of thoracic region            54 y.o.  female with H/o back pain and thoracic radicular pain.     Intermittent muscle spasms +     Muscle relaxants helps.     Past H/o uterine cancer- s/p surgery/ Chemo/ RT- currently stable. No red flag signs/ symptoms.     Xray thoracic spine- reviewed      X ray Lumbar spine- reviewed    PLAN:     Relafen for prn use - 750 mg po bid prn. Refill given. (BUN / Creatinine- normal on 11/2/2021)     Zanaflex po bid prn. Offered Trial of Gabapentin- patient declined.     Physical therapy- could not do due to high copay.      Recommend MRI of thoraicc spine / LS spine- patient declined - due to co-pay issues.     F/U as needed.  Recommend re-eval if pain worsens/ if she notices red flag symptoms.     Counseling : Patient encouraged to stay active and to watch/lose weight     Encouraged to continue Regular home exercise program as tolerated - stretching / strengthening.     Treatment plan discussed with the patient including medication and procedure side effects.     Controlled Substances Monitoring:      OARRS reviewed- not on chronic opioids.     Doss Schilder, MD    CC:  RENNY Cruz - NP

## 2022-01-25 RX ORDER — BLOOD-GLUCOSE METER
KIT MISCELLANEOUS
Qty: 100 EACH | Refills: 0 | Status: SHIPPED | OUTPATIENT
Start: 2022-01-25

## 2022-01-26 ENCOUNTER — OFFICE VISIT (OUTPATIENT)
Dept: PRIMARY CARE CLINIC | Age: 52
End: 2022-01-26
Payer: COMMERCIAL

## 2022-01-26 VITALS
DIASTOLIC BLOOD PRESSURE: 80 MMHG | OXYGEN SATURATION: 98 % | BODY MASS INDEX: 29.26 KG/M2 | HEART RATE: 105 BPM | RESPIRATION RATE: 16 BRPM | TEMPERATURE: 96.9 F | WEIGHT: 159 LBS | HEIGHT: 62 IN | SYSTOLIC BLOOD PRESSURE: 140 MMHG

## 2022-01-26 DIAGNOSIS — G51.0 RIGHT-SIDED BELL'S PALSY: ICD-10-CM

## 2022-01-26 DIAGNOSIS — R03.0 ELEVATED BLOOD PRESSURE READING: ICD-10-CM

## 2022-01-26 DIAGNOSIS — D69.6 THROMBOCYTOPENIA (HCC): ICD-10-CM

## 2022-01-26 DIAGNOSIS — Z09 HOSPITAL DISCHARGE FOLLOW-UP: Primary | ICD-10-CM

## 2022-01-26 DIAGNOSIS — E11.649 TYPE 2 DIABETES MELLITUS WITH HYPOGLYCEMIA WITHOUT COMA, WITHOUT LONG-TERM CURRENT USE OF INSULIN (HCC): ICD-10-CM

## 2022-01-26 LAB
ALBUMIN SERPL-MCNC: 4.1 G/DL (ref 3.5–5.2)
ALP BLD-CCNC: 108 U/L (ref 35–104)
ALT SERPL-CCNC: 9 U/L (ref 0–32)
ANION GAP SERPL CALCULATED.3IONS-SCNC: 12 MMOL/L (ref 7–16)
ANISOCYTOSIS: ABNORMAL
AST SERPL-CCNC: 21 U/L (ref 0–31)
BASOPHILS ABSOLUTE: 0.01 E9/L (ref 0–0.2)
BASOPHILS RELATIVE PERCENT: 0.2 % (ref 0–2)
BILIRUB SERPL-MCNC: 0.6 MG/DL (ref 0–1.2)
BUN BLDV-MCNC: 5 MG/DL (ref 6–20)
BURR CELLS: ABNORMAL
CALCIUM SERPL-MCNC: 10 MG/DL (ref 8.6–10.2)
CHLORIDE BLD-SCNC: 101 MMOL/L (ref 98–107)
CO2: 25 MMOL/L (ref 22–29)
CREAT SERPL-MCNC: 0.6 MG/DL (ref 0.5–1)
EOSINOPHILS ABSOLUTE: 0.08 E9/L (ref 0.05–0.5)
EOSINOPHILS RELATIVE PERCENT: 2 % (ref 0–6)
GFR AFRICAN AMERICAN: >60
GFR NON-AFRICAN AMERICAN: >60 ML/MIN/1.73
GLUCOSE BLD-MCNC: 150 MG/DL (ref 74–99)
HBA1C MFR BLD: 6.4 % (ref 4–5.6)
HCT VFR BLD CALC: 43.9 % (ref 34–48)
HEMOGLOBIN: 13.8 G/DL (ref 11.5–15.5)
IMMATURE GRANULOCYTES #: 0.02 E9/L
IMMATURE GRANULOCYTES %: 0.5 % (ref 0–5)
LYMPHOCYTES ABSOLUTE: 0.45 E9/L (ref 1.5–4)
LYMPHOCYTES RELATIVE PERCENT: 11.1 % (ref 20–42)
MCH RBC QN AUTO: 27.5 PG (ref 26–35)
MCHC RBC AUTO-ENTMCNC: 31.4 % (ref 32–34.5)
MCV RBC AUTO: 87.5 FL (ref 80–99.9)
MONOCYTES ABSOLUTE: 0.29 E9/L (ref 0.1–0.95)
MONOCYTES RELATIVE PERCENT: 7.2 % (ref 2–12)
NEUTROPHILS ABSOLUTE: 3.2 E9/L (ref 1.8–7.3)
NEUTROPHILS RELATIVE PERCENT: 79 % (ref 43–80)
OVALOCYTES: ABNORMAL
PDW BLD-RTO: 14.7 FL (ref 11.5–15)
PLATELET # BLD: 158 E9/L (ref 130–450)
PMV BLD AUTO: 10.5 FL (ref 7–12)
POIKILOCYTES: ABNORMAL
POLYCHROMASIA: ABNORMAL
POTASSIUM SERPL-SCNC: 4.5 MMOL/L (ref 3.5–5)
RBC # BLD: 5.02 E12/L (ref 3.5–5.5)
SODIUM BLD-SCNC: 138 MMOL/L (ref 132–146)
TOTAL PROTEIN: 7 G/DL (ref 6.4–8.3)
WBC # BLD: 4.1 E9/L (ref 4.5–11.5)

## 2022-01-26 PROCEDURE — G8417 CALC BMI ABV UP PARAM F/U: HCPCS | Performed by: STUDENT IN AN ORGANIZED HEALTH CARE EDUCATION/TRAINING PROGRAM

## 2022-01-26 PROCEDURE — 2022F DILAT RTA XM EVC RTNOPTHY: CPT | Performed by: STUDENT IN AN ORGANIZED HEALTH CARE EDUCATION/TRAINING PROGRAM

## 2022-01-26 PROCEDURE — 99214 OFFICE O/P EST MOD 30 MIN: CPT | Performed by: STUDENT IN AN ORGANIZED HEALTH CARE EDUCATION/TRAINING PROGRAM

## 2022-01-26 PROCEDURE — G8484 FLU IMMUNIZE NO ADMIN: HCPCS | Performed by: STUDENT IN AN ORGANIZED HEALTH CARE EDUCATION/TRAINING PROGRAM

## 2022-01-26 PROCEDURE — 3046F HEMOGLOBIN A1C LEVEL >9.0%: CPT | Performed by: STUDENT IN AN ORGANIZED HEALTH CARE EDUCATION/TRAINING PROGRAM

## 2022-01-26 PROCEDURE — G8427 DOCREV CUR MEDS BY ELIG CLIN: HCPCS | Performed by: STUDENT IN AN ORGANIZED HEALTH CARE EDUCATION/TRAINING PROGRAM

## 2022-01-26 PROCEDURE — 1036F TOBACCO NON-USER: CPT | Performed by: STUDENT IN AN ORGANIZED HEALTH CARE EDUCATION/TRAINING PROGRAM

## 2022-01-26 RX ORDER — METFORMIN HYDROCHLORIDE 500 MG/1
500 TABLET, EXTENDED RELEASE ORAL
Qty: 30 TABLET | Refills: 2 | Status: SHIPPED | OUTPATIENT
Start: 2022-01-26

## 2022-01-26 ASSESSMENT — ENCOUNTER SYMPTOMS
VOICE CHANGE: 0
TROUBLE SWALLOWING: 0
SHORTNESS OF BREATH: 0

## 2022-01-26 NOTE — PROGRESS NOTES
NEW PRIMARY CARE VISIT    22  Name: Damir Hoffman   : 1970   Age: 54 y.o. Sex: female        Assessment & Plan:     Problem List Items Addressed This Visit        Circulatory    Elevated blood pressure reading     Intermittently normal  Patient feels this is due to pain, stress  Consider lisinopril in setting of diabetes if remains elevated         Relevant Orders    COMPREHENSIVE METABOLIC PANEL       Endocrine    Type 2 diabetes mellitus with hypoglycemia, without long-term current use of insulin (HCC)     Improved however with episode of hypoglycemia  Stop Januvia  Change metformin to XR due to GI side effects  Check A1c  Follow up 3 months with PCP         Relevant Medications    metFORMIN (GLUCOPHAGE XR) 500 MG extended release tablet    Other Relevant Orders    HEMOGLOBIN A1C    COMPREHENSIVE METABOLIC PANEL       Nervous and Auditory    Right-sided Bell's palsy     Likely secondary to Shingles right 7th cranial nerve  Completed course valacyclovir  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 Counseled patient as appropriate and relevant regarding any possible side effects, risks, and alternatives to treatment; the patient verbalizes understanding, and is in agreement with the plan as detailed above. All educational materials and instructions were discussed and included on the After Visit Summary. All questions answered to the patient's satisfaction. The patient was advised to call for any concerns prior to next appointment. Return in about 3 months (around 2022).     38 minutes was spent precharting, face-to-face with patient, and documenting on day of encounter. This provider and patient were wearing surgical masks and practiced social distancing when appropriate during visit due to COVID-19 pandemic. 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 Eyes: Negative for visual disturbance. Respiratory: Negative for shortness of breath. Cardiovascular: Negative for chest pain. Musculoskeletal: Positive for arthralgias and myalgias. Neurological: Positive for facial asymmetry (right face) and weakness (right face). Negative for speech difficulty, numbness and headaches. Medical History:   History reviewed and updated as needed.     Patient Active Problem List   Diagnosis    Type 2 diabetes mellitus with hyperglycemia, without long-term current use of insulin (HCC)    Rheumatoid arthritis ([address]    Acute bilateral thoracic back pain        Past Medical History:   Diagnosis Date    Arthritis     Cancer ([address]     Diabetes mellitus ([address]        Past Surgical History:   Procedure Laterality Date    ANKLE SURGERY      HYSTERECTOMY         Family History   Problem Relation Age of Onset   Ulisses Breen Cancer Mother     Cancer Father        Medications:     Current Outpatient Medications:     FREESTYLE LITE strip, USE 1 STRIP TO CHECK GLUCOSE TWICE DAILY AND AS NEEDED, Disp: 100 each, Rfl: 0    nabumetone (RELAFEN) 750 MG tablet, Take 1 tablet by mouth 2 times daily as needed for Pain (Patient taking differently: Take 750 mg by mouth daily ), Disp: 60 tablet, Rfl: 1    FreeStyle Lancets MISC, , Disp: , Rfl:     glucose monitoring (FREESTYLE FREEDOM) kit, 1 kit by Does not apply route daily, Disp: 1 kit, Rfl: 0    blood glucose monitor supplies, Alcohol Swabs, Lancets,  Test 2 times a day & as needed for symptoms of irregular blood glucose. Dispense sufficient amount for indicated testing frequency plus additional to accommodate PRN testing needs. , Disp: 100 each, Rfl: 0    SITagliptin (JANUVIA) 100 MG tablet, Take 1 tablet by mouth daily, Disp: 30 tablet, Rfl: 5    metFORMIN (GLUCOPHAGE) 500 MG tablet, Take 1 tablet by mouth 2 times daily (with meals) X 2 weeks, if tolerating, take 2 tablets by mouth in the morning and 1 tablet by mouth in the afternoon (with meals) X 2 weeks, if tolerating take 2 tablets by mouth twice daily (with meals), Disp: 120 tablet, Rfl: 3    tiZANidine (ZANAFLEX) 2 MG tablet, Take 1 tablet by mouth 2 times daily as needed (muscle spasm) (Patient not taking: Reported on 1/26/2022), Disp: 30 tablet, Rfl: 1    Allergies:   No Known Allergies    Social History:     Social History     Socioeconomic History    Marital status: Single     Spouse name: Not on file    Number of children: Not on file    Years of education: Not on file    Highest education level: Not on file   Occupational History    Not on file   Tobacco Use    Smoking status: Never Smoker    Smokeless tobacco: Never Used   Substance and Sexual Activity    Alcohol use: No    Drug use: No    Sexual activity: Not on file   Other Topics Concern    Not on file   Social History Narrative    Not on file     Social Determinants of Health     Financial Resource Strain: Unknown    Difficulty of Paying Living Expenses: Patient refused   Food Insecurity: No Food Insecurity    Worried About Running Out of Food in the Last Year: Never true    Ran Out of Food in the Last Year: Never true   Transportation Needs:     Lack of Transportation (Medical): Not on file    Lack of Transportation (Non-Medical): Not on file   Physical Activity:     Days of Exercise per Week: Not on file    Minutes of Exercise per Session: Not on file   Stress:     Feeling of Stress : Not on file   Social Connections:     Frequency of Communication with Friends and Family: Not on file    Frequency of Social Gatherings with Friends and Family: Not on file    Attends Congregation Services: Not on file    Active Member of [address] [place] [address] [employer] or Organizations: Not on file    Attends Club or Organization Meetings: Not on file    Marital Status: Not on file   Intimate Partner Violence:     Fear of Current or Ex-Partner: Not on file    Emotionally Abused: Not on file    Physically Abused: Not on file    Sexually Abused: Not on file   Housing Stability:     Unable to Pay for Housing in the Last Year: Not on file    Number of [place] in the Last Year: Not on file    Unstable Housing in the Last Year: Not on file       Physical Exam:     Vitals:    01/26/22 0758 01/26/22 0802 01/26/22 0843   BP: (!) 142/80 (!) 142/80 (!) 140/80   Pulse: 105     Resp: 16     Temp: 96.9 °F (36.1 °C)     TempSrc: Temporal     SpO2: 98%     Weight: 159 lb (72.1 kg)     Height: 5' 2\" (1.575 m)         BP Readings from Last 3 Encounters:   01/26/22 (!) 140/80   12/23/21 130/82   11/15/21 126/82       Wt Readings from Last 3 Encounters:   01/26/22 159 lb (72.1 kg)   12/23/21 162 lb (73.5 kg)   11/15/21 162 lb (73.5 kg)        Physical Exam  Vitals and nursing note reviewed. Constitutional:       General: She is not in acute distress. Appearance: Normal appearance. She is overweight. She is not ill-appearing or diaphoretic. Cardiovascular:      Rate and Rhythm: Normal rate and regular rhythm.       Heart sounds: Normal heart sounds. Pulmonary:      Effort: Pulmonary effort is normal. No respiratory distress. Breath sounds: Normal breath sounds. Musculoskeletal:      Right lower leg: No edema. Left lower leg: No edema. Skin:     General: Skin is warm and dry. Neurological:      Mental Status: She is alert and oriented to person, place, and time. Psychiatric:         Mood and Affect: Affect normal. Mood is anxious. Behavior: Behavior normal.         Testing:     Orders Placed This Encounter   Procedures    Lyme Disease Acute Reflexive Panel     Standing Status:   Future     Standing Expiration Date:   1/26/2023    HEMOGLOBIN A1C     Standing Status:   Future     Standing Expiration Date:   1/26/2023    COMPREHENSIVE METABOLIC PANEL     Standing Status:   Future     Standing Expiration Date:   1/26/2023        No results found for this or any previous visit (from the past 24 hour(s)).

## 2022-01-26 NOTE — ASSESSMENT & PLAN NOTE
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

## 2022-01-26 NOTE — ASSESSMENT & PLAN NOTE
Improved however with episode of hypoglycemia  Stop Januvia  Change metformin to XR due to GI side effects  Check A1c  Follow up 3 months with PCP

## 2022-01-26 NOTE — ASSESSMENT & PLAN NOTE
Intermittently normal  Patient feels this is due to pain, stress  Consider lisinopril in setting of diabetes if remains elevated

## 2022-01-27 LAB — PATHOLOGIST REVIEW: NORMAL

## 2022-01-29 LAB — LYME, EIA: 0.47 LIV (ref 0–1.2)

## 2022-04-05 ENCOUNTER — CARE COORDINATION (OUTPATIENT)
Dept: CARE COORDINATION | Age: 52
End: 2022-04-05

## 2022-04-05 NOTE — LETTER
4/5/2022    Paulette Dominguez  [address]      Dear Paulette Dominguez    My name is Vannessa Newton RN and I am an Ambulatory Care Manager who partners with Jonathan Antonio NP to improve patients' health. I've been trying to reach you via phone to let you know that, as a member of your care team, I will work with other providers involved in your care, offer education for your specific health conditions, and connect you with more resources as needed. This program is designed to provide you with the opportunity to have a [facility] partner with you for the following situations:     1) if you come home from the hospital or emergency room   2) to help manage your disease   3) when you would like assistance coordinating services or appointments    This added support is provided at no additional cost to you. My primary focus is to help you achieve specific goals and improve your health. Please call me at [phone] to further discuss how I can support your health care needs.     Sincerely,    FREDDY Mejia, RN  Ambulatory Care Manager

## 2022-04-05 NOTE — CARE COORDINATION
Attempted to contact pt to discuss care coordination and offer enrollment, no answer. Unable to leave VM as VM box is full, will try to reach pt another time. Will have introduction letter mailed to pt.

## 2022-04-06 NOTE — CARE COORDINATION
2nd attempt to contact pt to discuss care coordination and offer enrollment, no answer. Unable to leave a VM as VM box is full Will try to reach pt another time.

## 2022-04-11 ENCOUNTER — CARE COORDINATION (OUTPATIENT)
Dept: CARE COORDINATION | Age: 52
End: 2022-04-11

## 2022-04-11 NOTE — CARE COORDINATION
Final attempt to contact pt to discuss care coordination and offer enrollment, no answer. Unable to leave a VM as VM box is full. No further outreaches will be made at this time as pt has been unable to contact. Introduction letter mailed on 4/6/22.

## 2024-03-28 ENCOUNTER — OFFICE VISIT (OUTPATIENT)
Dept: PRIMARY CARE CLINIC | Age: 54
End: 2024-03-28
Payer: COMMERCIAL

## 2024-03-28 VITALS
HEIGHT: 62 IN | TEMPERATURE: 97.6 F | OXYGEN SATURATION: 96 % | SYSTOLIC BLOOD PRESSURE: 150 MMHG | BODY MASS INDEX: 33.31 KG/M2 | WEIGHT: 181 LBS | DIASTOLIC BLOOD PRESSURE: 88 MMHG | HEART RATE: 81 BPM

## 2024-03-28 DIAGNOSIS — I10 PRIMARY HYPERTENSION: ICD-10-CM

## 2024-03-28 DIAGNOSIS — M06.9 RHEUMATOID ARTHRITIS, INVOLVING UNSPECIFIED SITE, UNSPECIFIED WHETHER RHEUMATOID FACTOR PRESENT (HCC): ICD-10-CM

## 2024-03-28 DIAGNOSIS — M54.6 ACUTE BILATERAL THORACIC BACK PAIN: ICD-10-CM

## 2024-03-28 DIAGNOSIS — E11.649 TYPE 2 DIABETES MELLITUS WITH HYPOGLYCEMIA WITHOUT COMA, WITHOUT LONG-TERM CURRENT USE OF INSULIN (HCC): ICD-10-CM

## 2024-03-28 DIAGNOSIS — Z00.01 ENCOUNTER FOR GENERAL ADULT MEDICAL EXAMINATION WITH ABNORMAL FINDINGS: ICD-10-CM

## 2024-03-28 DIAGNOSIS — Z00.01 ENCOUNTER FOR GENERAL ADULT MEDICAL EXAMINATION WITH ABNORMAL FINDINGS: Primary | ICD-10-CM

## 2024-03-28 DIAGNOSIS — L40.9 PSORIASIS: ICD-10-CM

## 2024-03-28 PROBLEM — G51.0 RIGHT-SIDED BELL'S PALSY: Status: RESOLVED | Noted: 2022-01-26 | Resolved: 2024-03-28

## 2024-03-28 PROBLEM — R03.0 ELEVATED BLOOD PRESSURE READING: Status: RESOLVED | Noted: 2022-01-26 | Resolved: 2024-03-28

## 2024-03-28 LAB
ALBUMIN SERPL-MCNC: 4.4 G/DL (ref 3.5–5.2)
ALP BLD-CCNC: 117 U/L (ref 35–104)
ALT SERPL-CCNC: 12 U/L (ref 0–32)
ANION GAP SERPL CALCULATED.3IONS-SCNC: 18 MMOL/L (ref 7–16)
AST SERPL-CCNC: 24 U/L (ref 0–31)
BASOPHILS ABSOLUTE: 0.01 K/UL (ref 0–0.2)
BASOPHILS RELATIVE PERCENT: 0 % (ref 0–2)
BILIRUB SERPL-MCNC: 0.5 MG/DL (ref 0–1.2)
BUN BLDV-MCNC: 17 MG/DL (ref 6–20)
CALCIUM SERPL-MCNC: 10.1 MG/DL (ref 8.6–10.2)
CHLORIDE BLD-SCNC: 105 MMOL/L (ref 98–107)
CHOLESTEROL: 195 MG/DL
CO2: 19 MMOL/L (ref 22–29)
CREAT SERPL-MCNC: 0.7 MG/DL (ref 0.5–1)
EOSINOPHILS ABSOLUTE: 0.11 K/UL (ref 0.05–0.5)
EOSINOPHILS RELATIVE PERCENT: 2 % (ref 0–6)
GFR SERPL CREATININE-BSD FRML MDRD: >90 ML/MIN/1.73M2
GLUCOSE BLD-MCNC: 260 MG/DL (ref 74–99)
HBA1C MFR BLD: 10.4 % (ref 4–5.6)
HCT VFR BLD CALC: 44.4 % (ref 34–48)
HDLC SERPL-MCNC: 50 MG/DL
HEMOGLOBIN: 14.2 G/DL (ref 11.5–15.5)
IMMATURE GRANULOCYTES %: 0 % (ref 0–5)
IMMATURE GRANULOCYTES ABSOLUTE: <0.03 K/UL (ref 0–0.58)
LDL CHOLESTEROL: 94 MG/DL
LYMPHOCYTES ABSOLUTE: 0.63 K/UL (ref 1.5–4)
LYMPHOCYTES RELATIVE PERCENT: 14 % (ref 20–42)
MCH RBC QN AUTO: 26.3 PG (ref 26–35)
MCHC RBC AUTO-ENTMCNC: 32 G/DL (ref 32–34.5)
MCV RBC AUTO: 82.2 FL (ref 80–99.9)
MONOCYTES ABSOLUTE: 0.25 K/UL (ref 0.1–0.95)
MONOCYTES RELATIVE PERCENT: 6 % (ref 2–12)
NEUTROPHILS ABSOLUTE: 3.53 K/UL (ref 1.8–7.3)
NEUTROPHILS RELATIVE PERCENT: 78 % (ref 43–80)
PDW BLD-RTO: 13.9 % (ref 11.5–15)
PLATELET # BLD: 132 K/UL (ref 130–450)
PMV BLD AUTO: 11 FL (ref 7–12)
POTASSIUM SERPL-SCNC: 4.4 MMOL/L (ref 3.5–5)
RBC # BLD: 5.4 M/UL (ref 3.5–5.5)
RHEUMATOID FACTOR: <10 IU/ML (ref 0–13)
SODIUM BLD-SCNC: 142 MMOL/L (ref 132–146)
TOTAL PROTEIN: 7.7 G/DL (ref 6.4–8.3)
TRIGL SERPL-MCNC: 254 MG/DL
TSH SERPL DL<=0.05 MIU/L-ACNC: 2 UIU/ML (ref 0.27–4.2)
VITAMIN D 25-HYDROXY: 13.6 NG/ML (ref 30–100)
VLDLC SERPL CALC-MCNC: 51 MG/DL
WBC # BLD: 4.6 K/UL (ref 4.5–11.5)

## 2024-03-28 PROCEDURE — 99396 PREV VISIT EST AGE 40-64: CPT | Performed by: NURSE PRACTITIONER

## 2024-03-28 PROCEDURE — 3079F DIAST BP 80-89 MM HG: CPT | Performed by: NURSE PRACTITIONER

## 2024-03-28 PROCEDURE — G8484 FLU IMMUNIZE NO ADMIN: HCPCS | Performed by: NURSE PRACTITIONER

## 2024-03-28 PROCEDURE — 3077F SYST BP >= 140 MM HG: CPT | Performed by: NURSE PRACTITIONER

## 2024-03-28 RX ORDER — IBUPROFEN 200 MG
200 TABLET ORAL EVERY 6 HOURS PRN
COMMUNITY
End: 2024-03-28

## 2024-03-28 RX ORDER — LISINOPRIL 10 MG/1
10 TABLET ORAL DAILY
Qty: 90 TABLET | Refills: 1 | Status: SHIPPED | OUTPATIENT
Start: 2024-03-28

## 2024-03-28 RX ORDER — MELOXICAM 15 MG/1
15 TABLET ORAL DAILY
Qty: 90 TABLET | Refills: 1 | Status: SHIPPED | OUTPATIENT
Start: 2024-03-28

## 2024-03-28 SDOH — ECONOMIC STABILITY: FOOD INSECURITY: WITHIN THE PAST 12 MONTHS, THE FOOD YOU BOUGHT JUST DIDN'T LAST AND YOU DIDN'T HAVE MONEY TO GET MORE.: NEVER TRUE

## 2024-03-28 SDOH — ECONOMIC STABILITY: INCOME INSECURITY: HOW HARD IS IT FOR YOU TO PAY FOR THE VERY BASICS LIKE FOOD, HOUSING, MEDICAL CARE, AND HEATING?: NOT HARD AT ALL

## 2024-03-28 SDOH — ECONOMIC STABILITY: HOUSING INSECURITY
IN THE LAST 12 MONTHS, WAS THERE A TIME WHEN YOU DID NOT HAVE A STEADY PLACE TO SLEEP OR SLEPT IN A SHELTER (INCLUDING NOW)?: NO

## 2024-03-28 SDOH — ECONOMIC STABILITY: FOOD INSECURITY: WITHIN THE PAST 12 MONTHS, YOU WORRIED THAT YOUR FOOD WOULD RUN OUT BEFORE YOU GOT MONEY TO BUY MORE.: NEVER TRUE

## 2024-03-28 ASSESSMENT — PATIENT HEALTH QUESTIONNAIRE - PHQ9
SUM OF ALL RESPONSES TO PHQ9 QUESTIONS 1 & 2: 1
SUM OF ALL RESPONSES TO PHQ QUESTIONS 1-9: 1
SUM OF ALL RESPONSES TO PHQ QUESTIONS 1-9: 1
1. LITTLE INTEREST OR PLEASURE IN DOING THINGS: SEVERAL DAYS
SUM OF ALL RESPONSES TO PHQ QUESTIONS 1-9: 1
SUM OF ALL RESPONSES TO PHQ QUESTIONS 1-9: 1
2. FEELING DOWN, DEPRESSED OR HOPELESS: NOT AT ALL

## 2024-03-28 ASSESSMENT — ENCOUNTER SYMPTOMS
BACK PAIN: 1
SHORTNESS OF BREATH: 0
DIARRHEA: 0
CONSTIPATION: 0
WHEEZING: 0
ABDOMINAL PAIN: 0
NAUSEA: 0
COUGH: 0

## 2024-03-28 NOTE — ASSESSMENT & PLAN NOTE
poorly controlled.  Initiate lisinopril 10 mg daily.  Increase activity and follow a Mediterranean diet.  Labs today, these are not fasting.

## 2024-03-28 NOTE — ASSESSMENT & PLAN NOTE
Patient did not tolerate metformin.  Will obtain repeat lab work today.  Pending labs initiate Mounjaro.  Will start patient on ACE inhibitor at this time

## 2024-03-28 NOTE — PROGRESS NOTES
ESTABLISHED PRIMARY CARE VISIT  3/28/24  Name: Joi Maloney   : 1970   Age: 53 y.o.  Sex: female    Subjective:     Chief Complaint   Patient presents with    Annual Exam     Wants RA addressed     Joi Maloney presents to the office to follow-up on their chronic problems.  Patient has not been seen in the office by me since .  Issues that were addressed today include:  Hypertension-   Anxiety-poorly controlled.  She is currently not on any medication and is not interested at this time.  She is irritable in the office today.  She does participate in counseling every 2 weeks.  No psychiatry. Denies SI/HI.  Diabetes type 2- did not tolerate metformin.  She is currently not on any medications and has not been checking her blood sugars at all.  Her last A1c in  was 6.4.  She is up-to-date with a diabetic retinal eye exam, within the last months- no retinopathy.  She is currently not on a ACE or statin.  Mid thoracic back pain-no longer follows with pain management. Ibuprofen as needed. \"Sinching\" pain wrapping around the left thoracic chest wall.  Rheumatoid arthritis/psorasis-poorly controlled.  She was previously on methotrexate but has been off of this for about 10 years.  She does not follow with rheumatology and is requesting a referral.  She has been trialing Aquaphor and CeraVe a for psoriasis but is poorly controlled.  Uterine cancer- s/p hysterectomy. 2015. S/p chemo and radiation.  In remission  Hypertension-poorly controlled.  Patient reports that this is related to stress, however blood pressure has trended high over the last few years.  She does not check her blood pressures at home.  Review of Systems   Constitutional:  Negative for activity change, appetite change, chills, fatigue and fever.   Respiratory:  Negative for cough, shortness of breath and wheezing.    Cardiovascular:  Negative for chest pain, palpitations and leg swelling.   Gastrointestinal:  Negative for abdominal pain,

## 2024-03-28 NOTE — ASSESSMENT & PLAN NOTE
start meloxicam, Tylenol for breakthrough pain.  Would like to defer following with pain management at this time.

## 2024-03-29 DIAGNOSIS — E11.65 TYPE 2 DIABETES MELLITUS WITH HYPERGLYCEMIA, WITHOUT LONG-TERM CURRENT USE OF INSULIN (HCC): Primary | ICD-10-CM

## 2024-03-29 LAB
ANTI-NUCLEAR ANTIBODY (ANA): NEGATIVE
CREATININE URINE: 72.3 MG/DL (ref 29–226)
MICROALBUMIN/CREAT 24H UR: 16 MG/L (ref 0–19)
MICROALBUMIN/CREAT UR-RTO: 22 MCG/MG CREAT (ref 0–30)

## 2024-03-29 RX ORDER — ROSUVASTATIN CALCIUM 5 MG/1
5 TABLET, COATED ORAL NIGHTLY
Qty: 90 TABLET | Refills: 1 | Status: SHIPPED | OUTPATIENT
Start: 2024-03-29

## 2024-03-29 RX ORDER — TIRZEPATIDE 2.5 MG/.5ML
2.5 INJECTION, SOLUTION SUBCUTANEOUS WEEKLY
Qty: 2 ML | Refills: 0 | Status: SHIPPED | OUTPATIENT
Start: 2024-03-29

## 2024-03-29 RX ORDER — ERGOCALCIFEROL 1.25 MG/1
50000 CAPSULE ORAL WEEKLY
Qty: 12 CAPSULE | Refills: 1 | Status: SHIPPED | OUTPATIENT
Start: 2024-03-29

## 2024-03-29 RX ORDER — GLIPIZIDE 5 MG/1
5 TABLET ORAL 2 TIMES DAILY
Qty: 180 TABLET | Refills: 1 | Status: SHIPPED | OUTPATIENT
Start: 2024-03-29

## 2024-04-08 DIAGNOSIS — Z12.31 VISIT FOR SCREENING MAMMOGRAM: Primary | ICD-10-CM

## 2024-05-06 ENCOUNTER — HOSPITAL ENCOUNTER (OUTPATIENT)
Dept: MAMMOGRAPHY | Age: 54
Discharge: HOME | End: 2024-05-08

## 2024-05-06 DIAGNOSIS — Z12.31 VISIT FOR SCREENING MAMMOGRAM: ICD-10-CM

## 2024-05-24 ENCOUNTER — OFFICE VISIT (OUTPATIENT)
Dept: PRIMARY CARE CLINIC | Age: 54
End: 2024-05-24
Payer: COMMERCIAL

## 2024-05-24 VITALS
RESPIRATION RATE: 16 BRPM | HEART RATE: 70 BPM | BODY MASS INDEX: 32.94 KG/M2 | HEIGHT: 62 IN | WEIGHT: 179 LBS | OXYGEN SATURATION: 98 % | SYSTOLIC BLOOD PRESSURE: 134 MMHG | DIASTOLIC BLOOD PRESSURE: 80 MMHG | TEMPERATURE: 97.6 F

## 2024-05-24 DIAGNOSIS — E11.649 TYPE 2 DIABETES MELLITUS WITH HYPOGLYCEMIA WITHOUT COMA, WITHOUT LONG-TERM CURRENT USE OF INSULIN (HCC): Primary | ICD-10-CM

## 2024-05-24 DIAGNOSIS — I10 PRIMARY HYPERTENSION: ICD-10-CM

## 2024-05-24 DIAGNOSIS — M06.9 RHEUMATOID ARTHRITIS, INVOLVING UNSPECIFIED SITE, UNSPECIFIED WHETHER RHEUMATOID FACTOR PRESENT (HCC): ICD-10-CM

## 2024-05-24 PROCEDURE — G8427 DOCREV CUR MEDS BY ELIG CLIN: HCPCS | Performed by: NURSE PRACTITIONER

## 2024-05-24 PROCEDURE — 3079F DIAST BP 80-89 MM HG: CPT | Performed by: NURSE PRACTITIONER

## 2024-05-24 PROCEDURE — 2022F DILAT RTA XM EVC RTNOPTHY: CPT | Performed by: NURSE PRACTITIONER

## 2024-05-24 PROCEDURE — 3075F SYST BP GE 130 - 139MM HG: CPT | Performed by: NURSE PRACTITIONER

## 2024-05-24 PROCEDURE — 99214 OFFICE O/P EST MOD 30 MIN: CPT | Performed by: NURSE PRACTITIONER

## 2024-05-24 PROCEDURE — G8417 CALC BMI ABV UP PARAM F/U: HCPCS | Performed by: NURSE PRACTITIONER

## 2024-05-24 PROCEDURE — 3046F HEMOGLOBIN A1C LEVEL >9.0%: CPT | Performed by: NURSE PRACTITIONER

## 2024-05-24 PROCEDURE — 3017F COLORECTAL CA SCREEN DOC REV: CPT | Performed by: NURSE PRACTITIONER

## 2024-05-24 PROCEDURE — 1036F TOBACCO NON-USER: CPT | Performed by: NURSE PRACTITIONER

## 2024-06-03 ASSESSMENT — ENCOUNTER SYMPTOMS
SHORTNESS OF BREATH: 0
ABDOMINAL PAIN: 0
CONSTIPATION: 0
WHEEZING: 0
COUGH: 0
NAUSEA: 0
DIARRHEA: 0
BACK PAIN: 1

## 2024-06-03 NOTE — PROGRESS NOTES
ESTABLISHED PRIMARY CARE VISIT  3/28/24  Name: Joi Maloney   : 1970   Age: 54 y.o.  Sex: female    Subjective:     Chief Complaint   Patient presents with    1 Month Follow-Up    Hypertension     Joi Maloney presents to the office to follow-up on their chronic problems.  Issues that were addressed today include:  Anxiety-poorly controlled.  She is currently not on any medication and is not interested at this time.  She is irritable in the office today when discussing her chronic illnesses.  She does participate in counseling every 2 weeks.  No psychiatry. Denies SI/HI.  Diabetes type 2- did not tolerate metformin.  Last A1c 10.4.  At last office visit we did initiate rosuvastatin, glipizide and Mounjaro.  The patient began taking lisinopril with no other medications.  She reports that she does not want to be reliant on medications and is very upset when I am discussing possible complications of poorly controlled diabetes.  Hypertension-controlled with the addition of lisinopril.  She does not check her blood pressures at home.  Review of Systems   Constitutional:  Negative for activity change, appetite change, chills, fatigue and fever.   Respiratory:  Negative for cough, shortness of breath and wheezing.    Cardiovascular:  Negative for chest pain, palpitations and leg swelling.   Gastrointestinal:  Negative for abdominal pain, constipation, diarrhea and nausea.   Musculoskeletal:  Positive for arthralgias and back pain.   Skin:  Positive for rash.   Hematological:  Negative for adenopathy.   Psychiatric/Behavioral:  Positive for sleep disturbance. The patient is nervous/anxious.      Medications:     Current Outpatient Medications:     vitamin D (ERGOCALCIFEROL) 1.25 MG (24686 UT) CAPS capsule, Take 1 capsule by mouth once a week, Disp: 12 capsule, Rfl: 1    Continuous Blood Gluc Sensor (FREESTYLE MATA 2 SENSOR) MISC, Use as directed, Disp: 2 each, Rfl: 5    Continuous Blood Gluc

## 2024-06-03 NOTE — ASSESSMENT & PLAN NOTE
poorly controlled.  Patient did not tolerate metformin.  She reports that her diet is poor and she is not interested in making dietary changes at this time.  The patient is not interested in starting Mounjaro.  She is agreeable to starting glipizide only.  Encourage patient to use continuous glucose meter or check sugars 4 times a day.  Parameters were discussed with the patient.  Encourage patient to create a log and bring log to next office appointment.

## 2024-06-25 ENCOUNTER — OFFICE VISIT (OUTPATIENT)
Dept: PRIMARY CARE CLINIC | Age: 54
End: 2024-06-25
Payer: COMMERCIAL

## 2024-06-25 VITALS
WEIGHT: 188 LBS | HEART RATE: 72 BPM | SYSTOLIC BLOOD PRESSURE: 124 MMHG | OXYGEN SATURATION: 96 % | BODY MASS INDEX: 34.6 KG/M2 | DIASTOLIC BLOOD PRESSURE: 70 MMHG | HEIGHT: 62 IN | TEMPERATURE: 97.7 F

## 2024-06-25 DIAGNOSIS — M06.9 RHEUMATOID ARTHRITIS, INVOLVING UNSPECIFIED SITE, UNSPECIFIED WHETHER RHEUMATOID FACTOR PRESENT (HCC): ICD-10-CM

## 2024-06-25 DIAGNOSIS — E11.649 TYPE 2 DIABETES MELLITUS WITH HYPOGLYCEMIA WITHOUT COMA, WITHOUT LONG-TERM CURRENT USE OF INSULIN (HCC): Primary | ICD-10-CM

## 2024-06-25 DIAGNOSIS — I10 PRIMARY HYPERTENSION: ICD-10-CM

## 2024-06-25 DIAGNOSIS — M54.6 ACUTE BILATERAL THORACIC BACK PAIN: ICD-10-CM

## 2024-06-25 DIAGNOSIS — L40.9 PSORIASIS: ICD-10-CM

## 2024-06-25 LAB — HBA1C MFR BLD: 7.5 %

## 2024-06-25 PROCEDURE — 3078F DIAST BP <80 MM HG: CPT | Performed by: NURSE PRACTITIONER

## 2024-06-25 PROCEDURE — G8427 DOCREV CUR MEDS BY ELIG CLIN: HCPCS | Performed by: NURSE PRACTITIONER

## 2024-06-25 PROCEDURE — 83036 HEMOGLOBIN GLYCOSYLATED A1C: CPT | Performed by: NURSE PRACTITIONER

## 2024-06-25 PROCEDURE — 3017F COLORECTAL CA SCREEN DOC REV: CPT | Performed by: NURSE PRACTITIONER

## 2024-06-25 PROCEDURE — G8417 CALC BMI ABV UP PARAM F/U: HCPCS | Performed by: NURSE PRACTITIONER

## 2024-06-25 PROCEDURE — 2022F DILAT RTA XM EVC RTNOPTHY: CPT | Performed by: NURSE PRACTITIONER

## 2024-06-25 PROCEDURE — 99214 OFFICE O/P EST MOD 30 MIN: CPT | Performed by: NURSE PRACTITIONER

## 2024-06-25 PROCEDURE — G2211 COMPLEX E/M VISIT ADD ON: HCPCS | Performed by: NURSE PRACTITIONER

## 2024-06-25 PROCEDURE — 1036F TOBACCO NON-USER: CPT | Performed by: NURSE PRACTITIONER

## 2024-06-25 PROCEDURE — 3051F HG A1C>EQUAL 7.0%<8.0%: CPT | Performed by: NURSE PRACTITIONER

## 2024-06-25 PROCEDURE — 3074F SYST BP LT 130 MM HG: CPT | Performed by: NURSE PRACTITIONER

## 2024-06-25 RX ORDER — GLIPIZIDE 5 MG/1
5 TABLET ORAL 2 TIMES DAILY
Qty: 60 TABLET | Refills: 3 | Status: SHIPPED | OUTPATIENT
Start: 2024-06-25

## 2024-06-25 RX ORDER — GLIPIZIDE 10 MG/1
10 TABLET ORAL 2 TIMES DAILY
Qty: 180 TABLET | Refills: 1 | Status: SHIPPED
Start: 2024-06-25 | End: 2024-06-25

## 2024-06-25 RX ORDER — GLIPIZIDE 10 MG/1
10 TABLET, FILM COATED, EXTENDED RELEASE ORAL DAILY
Qty: 90 TABLET | Refills: 1 | Status: SHIPPED
Start: 2024-06-25 | End: 2024-06-25 | Stop reason: DRUGHIGH

## 2024-06-25 ASSESSMENT — ENCOUNTER SYMPTOMS
CONSTIPATION: 0
COUGH: 0
WHEEZING: 0
SHORTNESS OF BREATH: 0
BACK PAIN: 1
NAUSEA: 0
ABDOMINAL PAIN: 0
DIARRHEA: 0

## 2024-06-25 NOTE — PROGRESS NOTES
ESTABLISHED PRIMARY CARE VISIT  3/28/24  Name: Joi Maloney   : 1970   Age: 54 y.o.  Sex: female    Subjective:     Chief Complaint   Patient presents with    Diabetes     Joi Maloney presents to the office to follow-up on their chronic problems.  Issues that were addressed today include:  Anxiety-controlled.  She is currently not on any medication and is not interested at this time.  She does participate in counseling every 2 weeks.  No psychiatry. Denies SI/HI.  Diabetes type 2- did not tolerate metformin.  Last A1c 10.4, today 7.5.  She is tolerating lisinopril and glipizide 5 mg twice daily.  She is not checking blood sugars at home.  She does report that her diet is poor and sporadic in nature.  Hypertension-controlled with the addition of lisinopril.  She does not check her blood pressures at home.  Review of Systems   Constitutional:  Negative for activity change, appetite change, chills, fatigue and fever.   Respiratory:  Negative for cough, shortness of breath and wheezing.    Cardiovascular:  Negative for chest pain, palpitations and leg swelling.   Gastrointestinal:  Negative for abdominal pain, constipation, diarrhea and nausea.   Musculoskeletal:  Positive for arthralgias and back pain.   Skin:  Positive for rash.   Hematological:  Negative for adenopathy.   Psychiatric/Behavioral:  Negative for sleep disturbance. The patient is not nervous/anxious.      Medications:     Current Outpatient Medications:     glipiZIDE (GLUCOTROL) 5 MG tablet, Take 1 tablet by mouth 2 times daily, Disp: 60 tablet, Rfl: 3    vitamin D (ERGOCALCIFEROL) 1.25 MG (67247 UT) CAPS capsule, Take 1 capsule by mouth once a week, Disp: 12 capsule, Rfl: 1    lisinopril (PRINIVIL;ZESTRIL) 10 MG tablet, Take 1 tablet by mouth daily, Disp: 90 tablet, Rfl: 1  Physical Exam:     Vitals:    24 1303   BP: 124/70   Pulse: 72   Temp: 97.7 °F (36.5 °C)   SpO2: 96%   Weight: 85.3 kg (188 lb)   Height: 1.575 m (5' 2\")     BP

## 2024-06-25 NOTE — ASSESSMENT & PLAN NOTE
Controlled.  Continue glipizide 5 mg twice daily.  Intermittently check blood sugars at home.  Patient is to schedule a diabetic retinal eye exam.  She is tolerating an ACE inhibitor.  She denies statin therapy.

## 2024-06-27 ENCOUNTER — TELEPHONE (OUTPATIENT)
Dept: PRIMARY CARE CLINIC | Age: 54
End: 2024-06-27

## 2024-06-27 NOTE — TELEPHONE ENCOUNTER
Pt called in stating her employer will not work with her regarding the work restrictions. It either has to be all or nothing.   She is requesting a note stating she is unable to work at this time.     She would like a call back when the letter is ready, and she will pick it up.

## 2024-06-28 NOTE — TELEPHONE ENCOUNTER
Patient was read the message. She states that she NEEDs to talk with you and wanted to know how to do that. I advised that your schedule is full at this time. She asked if you could call her. I advised that I can put a message in asking about that.

## 2024-07-01 NOTE — TELEPHONE ENCOUNTER
I advised patient to ask for intermittent FMLA and she just kept saying she don't know what any of that is. She said her work said the only way they will work with her is if she has a paper saying she can't work. I again advised that you were not going to do that, but you could work with an intermittent one. 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 She said she will just have to wean herself off the medication bc she won't have benefits to pay for anything anymore and she will have to call in eventually and cancel all her appts here bc she won't have insurance.

## 2024-07-22 ENCOUNTER — HOSPITAL ENCOUNTER (OUTPATIENT)
Dept: MAMMOGRAPHY | Age: 54
Discharge: HOME | End: 2024-07-24
Payer: COMMERCIAL

## 2024-07-22 VITALS — BODY MASS INDEX: 33.13 KG/M2 | HEIGHT: 62 IN | WEIGHT: 180 LBS

## 2024-07-22 DIAGNOSIS — Z12.31 VISIT FOR SCREENING MAMMOGRAM: ICD-10-CM

## 2024-07-22 PROCEDURE — 77063 BREAST TOMOSYNTHESIS BI: CPT

## 2024-09-24 ENCOUNTER — OFFICE VISIT (OUTPATIENT)
Dept: PRIMARY CARE CLINIC | Age: 54
End: 2024-09-24
Payer: COMMERCIAL

## 2024-09-24 VITALS
HEART RATE: 72 BPM | TEMPERATURE: 97.6 F | OXYGEN SATURATION: 99 % | SYSTOLIC BLOOD PRESSURE: 124 MMHG | DIASTOLIC BLOOD PRESSURE: 60 MMHG | HEIGHT: 62 IN | BODY MASS INDEX: 34.23 KG/M2 | WEIGHT: 186 LBS

## 2024-09-24 DIAGNOSIS — E11.649 TYPE 2 DIABETES MELLITUS WITH HYPOGLYCEMIA WITHOUT COMA, WITHOUT LONG-TERM CURRENT USE OF INSULIN (HCC): Primary | ICD-10-CM

## 2024-09-24 DIAGNOSIS — M06.9 RHEUMATOID ARTHRITIS, INVOLVING UNSPECIFIED SITE, UNSPECIFIED WHETHER RHEUMATOID FACTOR PRESENT (HCC): ICD-10-CM

## 2024-09-24 DIAGNOSIS — L02.31 ABSCESS OF RIGHT BUTTOCK: ICD-10-CM

## 2024-09-24 DIAGNOSIS — E55.9 VITAMIN D DEFICIENCY: ICD-10-CM

## 2024-09-24 DIAGNOSIS — I10 PRIMARY HYPERTENSION: ICD-10-CM

## 2024-09-24 LAB — HBA1C MFR BLD: 11.4 %

## 2024-09-24 PROCEDURE — 1036F TOBACCO NON-USER: CPT | Performed by: NURSE PRACTITIONER

## 2024-09-24 PROCEDURE — 3078F DIAST BP <80 MM HG: CPT | Performed by: NURSE PRACTITIONER

## 2024-09-24 PROCEDURE — 3074F SYST BP LT 130 MM HG: CPT | Performed by: NURSE PRACTITIONER

## 2024-09-24 PROCEDURE — 3046F HEMOGLOBIN A1C LEVEL >9.0%: CPT | Performed by: NURSE PRACTITIONER

## 2024-09-24 PROCEDURE — 2022F DILAT RTA XM EVC RTNOPTHY: CPT | Performed by: NURSE PRACTITIONER

## 2024-09-24 PROCEDURE — 99214 OFFICE O/P EST MOD 30 MIN: CPT | Performed by: NURSE PRACTITIONER

## 2024-09-24 PROCEDURE — 83036 HEMOGLOBIN GLYCOSYLATED A1C: CPT | Performed by: NURSE PRACTITIONER

## 2024-09-24 PROCEDURE — 3017F COLORECTAL CA SCREEN DOC REV: CPT | Performed by: NURSE PRACTITIONER

## 2024-09-24 PROCEDURE — G2211 COMPLEX E/M VISIT ADD ON: HCPCS | Performed by: NURSE PRACTITIONER

## 2024-09-24 PROCEDURE — G8427 DOCREV CUR MEDS BY ELIG CLIN: HCPCS | Performed by: NURSE PRACTITIONER

## 2024-09-24 PROCEDURE — G8417 CALC BMI ABV UP PARAM F/U: HCPCS | Performed by: NURSE PRACTITIONER

## 2024-09-24 RX ORDER — LISINOPRIL 10 MG/1
10 TABLET ORAL DAILY
Qty: 90 TABLET | Refills: 1 | Status: SHIPPED | OUTPATIENT
Start: 2024-09-24

## 2024-09-24 RX ORDER — ERGOCALCIFEROL 1.25 MG/1
50000 CAPSULE, LIQUID FILLED ORAL WEEKLY
Qty: 12 CAPSULE | Refills: 1 | Status: SHIPPED | OUTPATIENT
Start: 2024-09-24

## 2024-09-24 RX ORDER — CEFDINIR 300 MG/1
300 CAPSULE ORAL 2 TIMES DAILY
Qty: 14 CAPSULE | Refills: 0 | Status: SHIPPED | OUTPATIENT
Start: 2024-09-24 | End: 2024-10-01

## 2024-09-24 RX ORDER — GLIPIZIDE 5 MG/1
5 TABLET ORAL 2 TIMES DAILY
Qty: 180 TABLET | Refills: 1 | Status: SHIPPED | OUTPATIENT
Start: 2024-09-24

## 2024-09-24 ASSESSMENT — ENCOUNTER SYMPTOMS
SHORTNESS OF BREATH: 0
NAUSEA: 0
CONSTIPATION: 0
ABDOMINAL PAIN: 0
DIARRHEA: 0
BACK PAIN: 1
COUGH: 0
WHEEZING: 0

## 2024-10-07 ENCOUNTER — OFFICE VISIT (OUTPATIENT)
Dept: RHEUMATOLOGY | Age: 54
End: 2024-10-07
Payer: COMMERCIAL

## 2024-10-07 VITALS
DIASTOLIC BLOOD PRESSURE: 113 MMHG | HEART RATE: 89 BPM | WEIGHT: 180 LBS | BODY MASS INDEX: 33.13 KG/M2 | SYSTOLIC BLOOD PRESSURE: 166 MMHG | HEIGHT: 62 IN

## 2024-10-07 DIAGNOSIS — Z79.899 HIGH RISK MEDICATION USE: ICD-10-CM

## 2024-10-07 DIAGNOSIS — L40.50 PSORIATIC ARTHRITIS (HCC): Primary | ICD-10-CM

## 2024-10-07 DIAGNOSIS — D84.9 IMMUNOSUPPRESSION (HCC): ICD-10-CM

## 2024-10-07 DIAGNOSIS — E11.649 TYPE 2 DIABETES MELLITUS WITH HYPOGLYCEMIA WITHOUT COMA, WITHOUT LONG-TERM CURRENT USE OF INSULIN (HCC): ICD-10-CM

## 2024-10-07 PROCEDURE — 3080F DIAST BP >= 90 MM HG: CPT | Performed by: INTERNAL MEDICINE

## 2024-10-07 PROCEDURE — 3017F COLORECTAL CA SCREEN DOC REV: CPT | Performed by: INTERNAL MEDICINE

## 2024-10-07 PROCEDURE — G8484 FLU IMMUNIZE NO ADMIN: HCPCS | Performed by: INTERNAL MEDICINE

## 2024-10-07 PROCEDURE — 99205 OFFICE O/P NEW HI 60 MIN: CPT | Performed by: INTERNAL MEDICINE

## 2024-10-07 PROCEDURE — 1036F TOBACCO NON-USER: CPT | Performed by: INTERNAL MEDICINE

## 2024-10-07 PROCEDURE — G8417 CALC BMI ABV UP PARAM F/U: HCPCS | Performed by: INTERNAL MEDICINE

## 2024-10-07 PROCEDURE — 3077F SYST BP >= 140 MM HG: CPT | Performed by: INTERNAL MEDICINE

## 2024-10-07 PROCEDURE — 3046F HEMOGLOBIN A1C LEVEL >9.0%: CPT | Performed by: INTERNAL MEDICINE

## 2024-10-07 PROCEDURE — G2211 COMPLEX E/M VISIT ADD ON: HCPCS | Performed by: INTERNAL MEDICINE

## 2024-10-07 PROCEDURE — 2022F DILAT RTA XM EVC RTNOPTHY: CPT | Performed by: INTERNAL MEDICINE

## 2024-10-07 PROCEDURE — G8427 DOCREV CUR MEDS BY ELIG CLIN: HCPCS | Performed by: INTERNAL MEDICINE

## 2024-10-07 RX ORDER — ADALIMUMAB 40MG/0.4ML
40 KIT SUBCUTANEOUS
Qty: 2 EACH | Refills: 5 | Status: SHIPPED | OUTPATIENT
Start: 2024-10-07

## 2024-10-07 ASSESSMENT — ENCOUNTER SYMPTOMS
SHORTNESS OF BREATH: 0
NAUSEA: 0
COLOR CHANGE: 0
DIARRHEA: 0
ABDOMINAL PAIN: 0
VOMITING: 0
COUGH: 0
TROUBLE SWALLOWING: 0

## 2024-10-07 NOTE — PROGRESS NOTES
Joi Maloney 1970 is a 54 y.o. female, here for evaluation of the following chief complaint(s):  New Patient (Patient here as a new patient for rheumatoid arthritis. )      Assessment & Plan   ASSESSMENT/PLAN:    Joi Maloney 1970 is a 54 y.o. female seen in consult for polyarthritis.    1.  Psoriatic arthritis-there is apparently some question in the past of rheumatoid versus psoriatic arthritis.  Clinical picture is certainly more consistent with psoriatic arthritis with diffuse psoriatic skin disease and synovitis on exam indicative of severe exacerbation.  Will need further workup as below to get a better characterization of her disease.  She has failed methotrexate in the past.  We will start her on Humira.  We discussed the risks, benefits, side effects.    2.  Immunosuppression-I recommend she stay up-to-date on vaccinations.  In the event of any acute infectious illness she should hold Humira.    3.  High risk medication use-we will update TB and hepatitis.  Will monitor for infection.    4.  Type 2 diabetes-will try to avoid steroids is much as possible.    5.  History of uterine cancer-no concern with TNF inhibitors with solid tumor cancer history.    1. Psoriatic arthritis (HCC)  -     MISCELLANEOUS SENDOUT 14.3.3 eta; Future  -     C-Reactive Protein; Future  -     Sedimentation Rate; Future  -     Cyclic Citrul Peptide Antibody, IgG; Future  -     CBC with Auto Differential; Future  -     Comprehensive Metabolic Panel; Future  -     XR HAND LEFT (MIN 3 VIEWS); Future  -     XR HAND RIGHT (MIN 3 VIEWS); Future  -     XR FOOT LEFT (MIN 3 VIEWS); Future  -     XR FOOT RIGHT (MIN 3 VIEWS); Future  -     Hepatitis B Core Antibody, Total; Future  -     Hepatitis C Antibody; Future  -     Hepatitis B Surface Antigen; Future  -     Hepatitis B Surface Antibody; Future  -     T-Spot TB Test; Future  -     adalimumab (HUMIRA, 2 PEN,) 40 MG/0.4ML PNKT; Inject 40 mg into the skin every 14 days,

## 2024-12-20 ENCOUNTER — OFFICE VISIT (OUTPATIENT)
Dept: PRIMARY CARE CLINIC | Age: 54
End: 2024-12-20
Payer: COMMERCIAL

## 2024-12-20 VITALS
SYSTOLIC BLOOD PRESSURE: 128 MMHG | BODY MASS INDEX: 34.96 KG/M2 | HEART RATE: 78 BPM | WEIGHT: 190 LBS | TEMPERATURE: 97.9 F | HEIGHT: 62 IN | DIASTOLIC BLOOD PRESSURE: 72 MMHG | OXYGEN SATURATION: 97 %

## 2024-12-20 DIAGNOSIS — E11.649 TYPE 2 DIABETES MELLITUS WITH HYPOGLYCEMIA WITHOUT COMA, WITHOUT LONG-TERM CURRENT USE OF INSULIN (HCC): ICD-10-CM

## 2024-12-20 DIAGNOSIS — E55.9 VITAMIN D DEFICIENCY: ICD-10-CM

## 2024-12-20 DIAGNOSIS — I10 PRIMARY HYPERTENSION: ICD-10-CM

## 2024-12-20 DIAGNOSIS — Z23 NEED FOR VACCINATION: ICD-10-CM

## 2024-12-20 DIAGNOSIS — F33.0 MILD EPISODE OF RECURRENT MAJOR DEPRESSIVE DISORDER (HCC): ICD-10-CM

## 2024-12-20 DIAGNOSIS — E11.65 TYPE 2 DIABETES MELLITUS WITH HYPERGLYCEMIA, WITHOUT LONG-TERM CURRENT USE OF INSULIN (HCC): Primary | ICD-10-CM

## 2024-12-20 DIAGNOSIS — Z12.11 SCREENING FOR COLON CANCER: ICD-10-CM

## 2024-12-20 DIAGNOSIS — M54.6 CHRONIC BILATERAL THORACIC BACK PAIN: ICD-10-CM

## 2024-12-20 DIAGNOSIS — L40.50 PSORIATIC ARTHRITIS (HCC): ICD-10-CM

## 2024-12-20 DIAGNOSIS — M06.9 RHEUMATOID ARTHRITIS, INVOLVING UNSPECIFIED SITE, UNSPECIFIED WHETHER RHEUMATOID FACTOR PRESENT (HCC): ICD-10-CM

## 2024-12-20 DIAGNOSIS — G89.29 CHRONIC BILATERAL THORACIC BACK PAIN: ICD-10-CM

## 2024-12-20 LAB
ALBUMIN: 4 G/DL (ref 3.5–5.2)
ALP BLD-CCNC: 149 U/L (ref 35–104)
ALT SERPL-CCNC: 21 U/L (ref 0–32)
ANION GAP SERPL CALCULATED.3IONS-SCNC: 6 MMOL/L (ref 7–16)
AST SERPL-CCNC: 29 U/L (ref 0–31)
BASOPHILS ABSOLUTE: 0.01 K/UL (ref 0–0.2)
BASOPHILS RELATIVE PERCENT: 0 % (ref 0–2)
BILIRUB SERPL-MCNC: 0.3 MG/DL (ref 0–1.2)
BUN BLDV-MCNC: 13 MG/DL (ref 6–20)
CALCIUM SERPL-MCNC: 9.6 MG/DL (ref 8.6–10.2)
CHLORIDE BLD-SCNC: 102 MMOL/L (ref 98–107)
CHOLESTEROL, TOTAL: 212 MG/DL
CO2: 28 MMOL/L (ref 22–29)
CREAT SERPL-MCNC: 0.7 MG/DL (ref 0.5–1)
EOSINOPHILS ABSOLUTE: 0.08 K/UL (ref 0.05–0.5)
EOSINOPHILS RELATIVE PERCENT: 2 % (ref 0–6)
GFR, ESTIMATED: >90 ML/MIN/1.73M2
GLUCOSE BLD-MCNC: 247 MG/DL (ref 74–99)
HBA1C MFR BLD: 12.1 %
HCT VFR BLD CALC: 45 % (ref 34–48)
HDLC SERPL-MCNC: 48 MG/DL
HEMOGLOBIN: 14.2 G/DL (ref 11.5–15.5)
IMMATURE GRANULOCYTES %: 0 % (ref 0–5)
IMMATURE GRANULOCYTES ABSOLUTE: <0.03 K/UL (ref 0–0.58)
LDL CHOLESTEROL: 115 MG/DL
LYMPHOCYTES ABSOLUTE: 0.85 K/UL (ref 1.5–4)
LYMPHOCYTES RELATIVE PERCENT: 17 % (ref 20–42)
MCH RBC QN AUTO: 26.3 PG (ref 26–35)
MCHC RBC AUTO-ENTMCNC: 31.6 G/DL (ref 32–34.5)
MCV RBC AUTO: 83.3 FL (ref 80–99.9)
MONOCYTES ABSOLUTE: 0.32 K/UL (ref 0.1–0.95)
MONOCYTES RELATIVE PERCENT: 7 % (ref 2–12)
NEUTROPHILS ABSOLUTE: 3.61 K/UL (ref 1.8–7.3)
NEUTROPHILS RELATIVE PERCENT: 74 % (ref 43–80)
PDW BLD-RTO: 13.4 % (ref 11.5–15)
PLATELET # BLD: 116 K/UL (ref 130–450)
PMV BLD AUTO: 10.7 FL (ref 7–12)
POTASSIUM SERPL-SCNC: 4.5 MMOL/L (ref 3.5–5)
RBC # BLD: 5.4 M/UL (ref 3.5–5.5)
SODIUM BLD-SCNC: 136 MMOL/L (ref 132–146)
TOTAL PROTEIN: 6.7 G/DL (ref 6.4–8.3)
TRIGL SERPL-MCNC: 244 MG/DL
TSH SERPL DL<=0.05 MIU/L-ACNC: 2.47 UIU/ML (ref 0.27–4.2)
VITAMIN D 25-HYDROXY: 29.7 NG/ML (ref 30–100)
VLDLC SERPL CALC-MCNC: 49 MG/DL
WBC # BLD: 4.9 K/UL (ref 4.5–11.5)

## 2024-12-20 PROCEDURE — 90715 TDAP VACCINE 7 YRS/> IM: CPT | Performed by: NURSE PRACTITIONER

## 2024-12-20 PROCEDURE — 83036 HEMOGLOBIN GLYCOSYLATED A1C: CPT | Performed by: NURSE PRACTITIONER

## 2024-12-20 PROCEDURE — 3046F HEMOGLOBIN A1C LEVEL >9.0%: CPT | Performed by: NURSE PRACTITIONER

## 2024-12-20 PROCEDURE — 3017F COLORECTAL CA SCREEN DOC REV: CPT | Performed by: NURSE PRACTITIONER

## 2024-12-20 PROCEDURE — G8417 CALC BMI ABV UP PARAM F/U: HCPCS | Performed by: NURSE PRACTITIONER

## 2024-12-20 PROCEDURE — 99214 OFFICE O/P EST MOD 30 MIN: CPT | Performed by: NURSE PRACTITIONER

## 2024-12-20 PROCEDURE — G8484 FLU IMMUNIZE NO ADMIN: HCPCS | Performed by: NURSE PRACTITIONER

## 2024-12-20 PROCEDURE — G8427 DOCREV CUR MEDS BY ELIG CLIN: HCPCS | Performed by: NURSE PRACTITIONER

## 2024-12-20 PROCEDURE — 2022F DILAT RTA XM EVC RTNOPTHY: CPT | Performed by: NURSE PRACTITIONER

## 2024-12-20 PROCEDURE — 3074F SYST BP LT 130 MM HG: CPT | Performed by: NURSE PRACTITIONER

## 2024-12-20 PROCEDURE — 3078F DIAST BP <80 MM HG: CPT | Performed by: NURSE PRACTITIONER

## 2024-12-20 PROCEDURE — 90471 IMMUNIZATION ADMIN: CPT | Performed by: NURSE PRACTITIONER

## 2024-12-20 PROCEDURE — 1036F TOBACCO NON-USER: CPT | Performed by: NURSE PRACTITIONER

## 2024-12-20 RX ORDER — ERGOCALCIFEROL 1.25 MG/1
50000 CAPSULE, LIQUID FILLED ORAL WEEKLY
Qty: 12 CAPSULE | Refills: 1 | Status: SHIPPED
Start: 2024-12-20 | End: 2024-12-20

## 2024-12-20 RX ORDER — GLIPIZIDE 5 MG/1
5 TABLET ORAL 2 TIMES DAILY
Qty: 180 TABLET | Refills: 1 | Status: SHIPPED
Start: 2024-12-20 | End: 2024-12-20

## 2024-12-20 RX ORDER — LISINOPRIL 10 MG/1
10 TABLET ORAL DAILY
Qty: 90 TABLET | Refills: 1 | Status: SHIPPED | OUTPATIENT
Start: 2024-12-20

## 2024-12-20 RX ORDER — ERGOCALCIFEROL 1.25 MG/1
50000 CAPSULE, LIQUID FILLED ORAL WEEKLY
Qty: 12 CAPSULE | Refills: 1 | Status: SHIPPED | OUTPATIENT
Start: 2024-12-20

## 2024-12-20 RX ORDER — GLIPIZIDE 5 MG/1
5 TABLET ORAL 2 TIMES DAILY
Qty: 180 TABLET | Refills: 1 | Status: SHIPPED | OUTPATIENT
Start: 2024-12-20

## 2024-12-20 RX ORDER — LISINOPRIL 10 MG/1
10 TABLET ORAL DAILY
Qty: 90 TABLET | Refills: 1 | Status: SHIPPED
Start: 2024-12-20 | End: 2024-12-20

## 2024-12-20 RX ORDER — MELOXICAM 15 MG/1
15 TABLET ORAL DAILY PRN
Qty: 90 TABLET | Refills: 1 | Status: SHIPPED
Start: 2024-12-20 | End: 2024-12-20

## 2024-12-20 RX ORDER — MELOXICAM 15 MG/1
15 TABLET ORAL DAILY PRN
Qty: 90 TABLET | Refills: 1 | Status: SHIPPED | OUTPATIENT
Start: 2024-12-20

## 2024-12-20 ASSESSMENT — ENCOUNTER SYMPTOMS
WHEEZING: 0
SHORTNESS OF BREATH: 0
CHEST TIGHTNESS: 0
COUGH: 0
BACK PAIN: 1
CONSTIPATION: 0
DIARRHEA: 0
NAUSEA: 0
ABDOMINAL PAIN: 0
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
## 2025-01-08 LAB — NONINV COLON CA DNA+OCC BLD SCRN STL QL: NEGATIVE

## 2025-03-03 ENCOUNTER — OFFICE VISIT (OUTPATIENT)
Dept: RHEUMATOLOGY | Age: 55
End: 2025-03-03
Payer: COMMERCIAL

## 2025-03-03 VITALS
SYSTOLIC BLOOD PRESSURE: 123 MMHG | OXYGEN SATURATION: 100 % | BODY MASS INDEX: 33.13 KG/M2 | HEIGHT: 62 IN | DIASTOLIC BLOOD PRESSURE: 79 MMHG | WEIGHT: 180 LBS | HEART RATE: 84 BPM

## 2025-03-03 DIAGNOSIS — Z79.899 HIGH RISK MEDICATION USE: ICD-10-CM

## 2025-03-03 DIAGNOSIS — E11.649 TYPE 2 DIABETES MELLITUS WITH HYPOGLYCEMIA WITHOUT COMA, WITHOUT LONG-TERM CURRENT USE OF INSULIN (HCC): ICD-10-CM

## 2025-03-03 DIAGNOSIS — L40.50 PSORIATIC ARTHRITIS (HCC): Primary | ICD-10-CM

## 2025-03-03 DIAGNOSIS — D84.9 IMMUNOSUPPRESSION: ICD-10-CM

## 2025-03-03 PROCEDURE — 1036F TOBACCO NON-USER: CPT | Performed by: INTERNAL MEDICINE

## 2025-03-03 PROCEDURE — G8427 DOCREV CUR MEDS BY ELIG CLIN: HCPCS | Performed by: INTERNAL MEDICINE

## 2025-03-03 PROCEDURE — 99215 OFFICE O/P EST HI 40 MIN: CPT | Performed by: INTERNAL MEDICINE

## 2025-03-03 PROCEDURE — G2211 COMPLEX E/M VISIT ADD ON: HCPCS | Performed by: INTERNAL MEDICINE

## 2025-03-03 PROCEDURE — G8417 CALC BMI ABV UP PARAM F/U: HCPCS | Performed by: INTERNAL MEDICINE

## 2025-03-03 PROCEDURE — 3074F SYST BP LT 130 MM HG: CPT | Performed by: INTERNAL MEDICINE

## 2025-03-03 PROCEDURE — 3046F HEMOGLOBIN A1C LEVEL >9.0%: CPT | Performed by: INTERNAL MEDICINE

## 2025-03-03 PROCEDURE — 3078F DIAST BP <80 MM HG: CPT | Performed by: INTERNAL MEDICINE

## 2025-03-03 PROCEDURE — 2022F DILAT RTA XM EVC RTNOPTHY: CPT | Performed by: INTERNAL MEDICINE

## 2025-03-03 PROCEDURE — 3017F COLORECTAL CA SCREEN DOC REV: CPT | Performed by: INTERNAL MEDICINE

## 2025-03-03 ASSESSMENT — ENCOUNTER SYMPTOMS
COUGH: 0
SHORTNESS OF BREATH: 0
TROUBLE SWALLOWING: 0
COLOR CHANGE: 0
VOMITING: 0
NAUSEA: 0
DIARRHEA: 0
ABDOMINAL PAIN: 0

## 2025-03-03 NOTE — PROGRESS NOTES
General: No focal deficit present.      Mental Status: She is alert and oriented to person, place, and time. Mental status is at baseline.   Psychiatric:         Mood and Affect: Mood normal.         Behavior: Behavior normal.            Lab Results   Component Value Date    WBC 4.9 12/20/2024    HGB 14.2 12/20/2024    HCT 45.0 12/20/2024    MCV 83.3 12/20/2024     (L) 12/20/2024     Lab Results   Component Value Date     12/20/2024    K 4.5 12/20/2024     12/20/2024    CO2 28 12/20/2024    BUN 13 12/20/2024    CREATININE 0.7 12/20/2024    GLUCOSE 247 (H) 12/20/2024    CALCIUM 9.6 12/20/2024    BILITOT 0.3 12/20/2024    ALKPHOS 149 (H) 12/20/2024    AST 29 12/20/2024    ALT 21 12/20/2024    LABGLOM >90 12/20/2024    GFRAA >60 01/26/2022    AGRATIO 1.2 11/02/2021    GLOB 3.1 11/02/2021     Lab Results   Component Value Date    TIFFANY NEGATIVE 03/28/2024     No components found for: \"RHEUMFACTOR\"  No results found for: \"SEDRATE\"  No results found for: \"CRP\"         An electronic signature was used to authenticate this note.    This note was generated with a voice recognition dictation system. Please disregard any errors or omission which have escaped my review.    --John Salas DO

## 2025-03-19 ENCOUNTER — TELEPHONE (OUTPATIENT)
Dept: PRIMARY CARE CLINIC | Age: 55
End: 2025-03-19

## 2025-03-19 DIAGNOSIS — I10 PRIMARY HYPERTENSION: ICD-10-CM

## 2025-03-19 DIAGNOSIS — E11.649 TYPE 2 DIABETES MELLITUS WITH HYPOGLYCEMIA WITHOUT COMA, WITHOUT LONG-TERM CURRENT USE OF INSULIN (HCC): Primary | ICD-10-CM

## 2025-03-19 NOTE — TELEPHONE ENCOUNTER
When I called to confirm patient she wanted to know if Jeny wanted any blood work done on Friday at her appointment and if she does could she put in the order before Friday because she is getting other labs done here at 10.00  on Friday morning for another DrKatlyn And doesn't want to get drawn twice.

## 2025-03-21 ENCOUNTER — OFFICE VISIT (OUTPATIENT)
Dept: PRIMARY CARE CLINIC | Age: 55
End: 2025-03-21
Payer: COMMERCIAL

## 2025-03-21 VITALS
BODY MASS INDEX: 37.73 KG/M2 | OXYGEN SATURATION: 96 % | HEIGHT: 62 IN | TEMPERATURE: 98.4 F | DIASTOLIC BLOOD PRESSURE: 72 MMHG | HEART RATE: 72 BPM | WEIGHT: 205 LBS | SYSTOLIC BLOOD PRESSURE: 128 MMHG

## 2025-03-21 DIAGNOSIS — D84.9 IMMUNOSUPPRESSION: ICD-10-CM

## 2025-03-21 DIAGNOSIS — Z79.899 HIGH RISK MEDICATION USE: ICD-10-CM

## 2025-03-21 DIAGNOSIS — M06.9 RHEUMATOID ARTHRITIS, INVOLVING UNSPECIFIED SITE, UNSPECIFIED WHETHER RHEUMATOID FACTOR PRESENT (HCC): ICD-10-CM

## 2025-03-21 DIAGNOSIS — R07.81 RIB PAIN ON LEFT SIDE: ICD-10-CM

## 2025-03-21 DIAGNOSIS — E11.649 TYPE 2 DIABETES MELLITUS WITH HYPOGLYCEMIA WITHOUT COMA, WITHOUT LONG-TERM CURRENT USE OF INSULIN (HCC): ICD-10-CM

## 2025-03-21 DIAGNOSIS — M54.6 LEFT-SIDED THORACIC BACK PAIN, UNSPECIFIED CHRONICITY: Primary | ICD-10-CM

## 2025-03-21 DIAGNOSIS — I10 PRIMARY HYPERTENSION: ICD-10-CM

## 2025-03-21 DIAGNOSIS — L40.50 PSORIATIC ARTHRITIS (HCC): ICD-10-CM

## 2025-03-21 DIAGNOSIS — F33.0 MILD EPISODE OF RECURRENT MAJOR DEPRESSIVE DISORDER: ICD-10-CM

## 2025-03-21 LAB
ALBUMIN: 4.2 G/DL (ref 3.5–5.2)
ALP BLD-CCNC: 109 U/L (ref 35–104)
ALT SERPL-CCNC: 22 U/L (ref 0–32)
ANION GAP SERPL CALCULATED.3IONS-SCNC: 14 MMOL/L (ref 7–16)
AST SERPL-CCNC: 32 U/L (ref 0–31)
BASOPHILS ABSOLUTE: 0.02 K/UL (ref 0–0.2)
BASOPHILS RELATIVE PERCENT: 0 % (ref 0–2)
BILIRUB SERPL-MCNC: 0.4 MG/DL (ref 0–1.2)
BUN BLDV-MCNC: 19 MG/DL (ref 6–20)
CALCIUM SERPL-MCNC: 9.7 MG/DL (ref 8.6–10.2)
CHLORIDE BLD-SCNC: 102 MMOL/L (ref 98–107)
CHOLESTEROL, TOTAL: 232 MG/DL
CO2: 20 MMOL/L (ref 22–29)
CREAT SERPL-MCNC: 0.8 MG/DL (ref 0.5–1)
EOSINOPHILS ABSOLUTE: 0.11 K/UL (ref 0.05–0.5)
EOSINOPHILS RELATIVE PERCENT: 2 % (ref 0–6)
GFR, ESTIMATED: 90 ML/MIN/1.73M2
GLUCOSE BLD-MCNC: 212 MG/DL (ref 74–99)
HBA1C MFR BLD: 8.2 % (ref 4–5.6)
HCT VFR BLD CALC: 42.9 % (ref 34–48)
HDLC SERPL-MCNC: 56 MG/DL
HEMOGLOBIN: 14 G/DL (ref 11.5–15.5)
IMMATURE GRANULOCYTES %: 1 % (ref 0–5)
IMMATURE GRANULOCYTES ABSOLUTE: 0.04 K/UL (ref 0–0.58)
LDL CHOLESTEROL: 142 MG/DL
LYMPHOCYTES ABSOLUTE: 0.74 K/UL (ref 1.5–4)
LYMPHOCYTES RELATIVE PERCENT: 13 % (ref 20–42)
MCH RBC QN AUTO: 27.5 PG (ref 26–35)
MCHC RBC AUTO-ENTMCNC: 32.6 G/DL (ref 32–34.5)
MCV RBC AUTO: 84.3 FL (ref 80–99.9)
MONOCYTES ABSOLUTE: 0.37 K/UL (ref 0.1–0.95)
MONOCYTES RELATIVE PERCENT: 6 % (ref 2–12)
NEUTROPHILS ABSOLUTE: 4.63 K/UL (ref 1.8–7.3)
NEUTROPHILS RELATIVE PERCENT: 78 % (ref 43–80)
PDW BLD-RTO: 13.8 % (ref 11.5–15)
PLATELET # BLD: 116 K/UL (ref 130–450)
PMV BLD AUTO: 10.8 FL (ref 7–12)
POTASSIUM SERPL-SCNC: 4.9 MMOL/L (ref 3.5–5)
RBC # BLD: 5.09 M/UL (ref 3.5–5.5)
SODIUM BLD-SCNC: 136 MMOL/L (ref 132–146)
TOTAL PROTEIN: 7.4 G/DL (ref 6.4–8.3)
TRIGL SERPL-MCNC: 171 MG/DL
VLDLC SERPL CALC-MCNC: 34 MG/DL
WBC # BLD: 5.9 K/UL (ref 4.5–11.5)

## 2025-03-21 PROCEDURE — 3078F DIAST BP <80 MM HG: CPT | Performed by: NURSE PRACTITIONER

## 2025-03-21 PROCEDURE — 99214 OFFICE O/P EST MOD 30 MIN: CPT | Performed by: NURSE PRACTITIONER

## 2025-03-21 PROCEDURE — 3017F COLORECTAL CA SCREEN DOC REV: CPT | Performed by: NURSE PRACTITIONER

## 2025-03-21 PROCEDURE — 1036F TOBACCO NON-USER: CPT | Performed by: NURSE PRACTITIONER

## 2025-03-21 PROCEDURE — G2211 COMPLEX E/M VISIT ADD ON: HCPCS | Performed by: NURSE PRACTITIONER

## 2025-03-21 PROCEDURE — G8427 DOCREV CUR MEDS BY ELIG CLIN: HCPCS | Performed by: NURSE PRACTITIONER

## 2025-03-21 PROCEDURE — 2022F DILAT RTA XM EVC RTNOPTHY: CPT | Performed by: NURSE PRACTITIONER

## 2025-03-21 PROCEDURE — G8417 CALC BMI ABV UP PARAM F/U: HCPCS | Performed by: NURSE PRACTITIONER

## 2025-03-21 PROCEDURE — 3052F HG A1C>EQUAL 8.0%<EQUAL 9.0%: CPT | Performed by: NURSE PRACTITIONER

## 2025-03-21 PROCEDURE — 3074F SYST BP LT 130 MM HG: CPT | Performed by: NURSE PRACTITIONER

## 2025-03-21 SDOH — ECONOMIC STABILITY: FOOD INSECURITY: WITHIN THE PAST 12 MONTHS, THE FOOD YOU BOUGHT JUST DIDN'T LAST AND YOU DIDN'T HAVE MONEY TO GET MORE.: NEVER TRUE

## 2025-03-21 SDOH — ECONOMIC STABILITY: FOOD INSECURITY: WITHIN THE PAST 12 MONTHS, YOU WORRIED THAT YOUR FOOD WOULD RUN OUT BEFORE YOU GOT MONEY TO BUY MORE.: NEVER TRUE

## 2025-03-21 ASSESSMENT — PATIENT HEALTH QUESTIONNAIRE - PHQ9
SUM OF ALL RESPONSES TO PHQ QUESTIONS 1-9: 1
SUM OF ALL RESPONSES TO PHQ QUESTIONS 1-9: 1
8. MOVING OR SPEAKING SO SLOWLY THAT OTHER PEOPLE COULD HAVE NOTICED. OR THE OPPOSITE, BEING SO FIGETY OR RESTLESS THAT YOU HAVE BEEN MOVING AROUND A LOT MORE THAN USUAL: NOT AT ALL
5. POOR APPETITE OR OVEREATING: NOT AT ALL
1. LITTLE INTEREST OR PLEASURE IN DOING THINGS: SEVERAL DAYS
3. TROUBLE FALLING OR STAYING ASLEEP: NOT AT ALL
9. THOUGHTS THAT YOU WOULD BE BETTER OFF DEAD, OR OF HURTING YOURSELF: NOT AT ALL
7. TROUBLE CONCENTRATING ON THINGS, SUCH AS READING THE NEWSPAPER OR WATCHING TELEVISION: NOT AT ALL
SUM OF ALL RESPONSES TO PHQ QUESTIONS 1-9: 1
2. FEELING DOWN, DEPRESSED OR HOPELESS: NOT AT ALL
10. IF YOU CHECKED OFF ANY PROBLEMS, HOW DIFFICULT HAVE THESE PROBLEMS MADE IT FOR YOU TO DO YOUR WORK, TAKE CARE OF THINGS AT HOME, OR GET ALONG WITH OTHER PEOPLE: NOT DIFFICULT AT ALL
6. FEELING BAD ABOUT YOURSELF - OR THAT YOU ARE A FAILURE OR HAVE LET YOURSELF OR YOUR FAMILY DOWN: NOT AT ALL
4. FEELING TIRED OR HAVING LITTLE ENERGY: NOT AT ALL
SUM OF ALL RESPONSES TO PHQ QUESTIONS 1-9: 1

## 2025-03-23 LAB — HEPATITIS B CORE TOTAL ANTIBODY: NONREACTIVE

## 2025-03-24 ENCOUNTER — RESULTS FOLLOW-UP (OUTPATIENT)
Dept: PRIMARY CARE CLINIC | Age: 55
End: 2025-03-24

## 2025-03-24 DIAGNOSIS — E11.65 TYPE 2 DIABETES MELLITUS WITH HYPERGLYCEMIA, WITHOUT LONG-TERM CURRENT USE OF INSULIN (HCC): ICD-10-CM

## 2025-03-24 DIAGNOSIS — D69.6 THROMBOCYTOPENIA: Primary | ICD-10-CM

## 2025-03-24 LAB
HBV SURFACE AB TITR SER: 3.85 MIU/ML (ref 0–9.99)
HEP B S AGB SURF AG: NONREACTIVE
HEPATITIS C ANTIBODY: NONREACTIVE

## 2025-03-24 RX ORDER — GLIPIZIDE 5 MG/1
TABLET ORAL
Qty: 270 TABLET | Refills: 0 | Status: SHIPPED | OUTPATIENT
Start: 2025-03-24

## 2025-03-25 ENCOUNTER — RESULTS FOLLOW-UP (OUTPATIENT)
Dept: RHEUMATOLOGY | Age: 55
End: 2025-03-25

## 2025-03-25 DIAGNOSIS — L40.50 PSORIATIC ARTHRITIS (HCC): ICD-10-CM

## 2025-03-25 LAB — T SPOT TB TEST: NORMAL

## 2025-03-25 NOTE — TELEPHONE ENCOUNTER
Re-pended Rx to send to [facility] as patient is needing the medication to be from a local pharmacy as she is unable to have medications sent to her from mail order pharmacy.      Rx pended.      Electronically signed by Jayshree Wasserman CMA on 3/25/2025 at 2:47 PM

## 2025-03-26 DIAGNOSIS — D69.6 THROMBOCYTOPENIA: Primary | ICD-10-CM

## 2025-03-26 RX ORDER — EZETIMIBE 10 MG/1
10 TABLET ORAL DAILY
Qty: 90 TABLET | Refills: 1 | Status: SHIPPED | OUTPATIENT
Start: 2025-03-26

## 2025-03-26 ASSESSMENT — ENCOUNTER SYMPTOMS
RHINORRHEA: 0
COLOR CHANGE: 0
EYES NEGATIVE: 1
COUGH: 0
FACIAL SWELLING: 0
APNEA: 0
ABDOMINAL PAIN: 0
ABDOMINAL DISTENTION: 0
CONSTIPATION: 0
VOMITING: 0
NAUSEA: 0
CHEST TIGHTNESS: 0
VOICE CHANGE: 0
SHORTNESS OF BREATH: 0
BACK PAIN: 0
DIARRHEA: 0
WHEEZING: 0

## 2025-03-26 NOTE — PROGRESS NOTES
ESTABLISHED PRIMARY CARE VISIT  3/26/25  Name: Joi Maloney   : 1970   Age: 54 y.o.  Sex: female   Chief Complaint   Patient presents with    Follow-up     HPI:     History of Present Illness  The patient is a 54-year-old female here for a 3 month follow-up.    Adverse reaction to rosuvastatin  - Experiencing progressive softening of stools and watery diarrhea  - Symptoms resolved upon discontinuation and recurred upon rechallenge, confirming the medication as the cause  - Concerned about affording supplements due to unemployment  - HLD is poorly controlled.     Blood glucose monitoring  - Has not been monitoring blood glucose  - Continues glipizide twice daily  - No polyuria, polyphagia, or polydipsia  - Increased dark chocolate consumption due to depression    Mood disorder  - Undergoing counseling biweekly  - Not interested in pharmacological treatment  - patient reports poor dietary habits secondary to dysthymia.    Persistent rib pain  - Post-traumatic injury, no treatment initiated due to insurance issues for MRI and physical therapy  - Some relief with muscle relaxants and stretching  - Pain described as pressure and pulling, exacerbated by overexertion, radiating to knee  - Meloxicam used for arthritis-related pain, ineffective for current symptoms    Supplemental information: None    SOCIAL HISTORY  Non-smoker.    MEDICATIONS  Current: glipizide, meloxicam  Discontinued: rosuvastatin  Subjective:   Review of Systems   Constitutional:  Negative for activity change, appetite change, fatigue, fever and unexpected weight change.   HENT:  Negative for congestion, facial swelling, mouth sores, postnasal drip, rhinorrhea, sneezing, tinnitus and voice change.    Eyes: Negative.    Respiratory:  Negative for apnea, cough, chest tightness, shortness of breath and wheezing.    Cardiovascular:  Negative for chest pain, palpitations and leg swelling.   Gastrointestinal:  Negative for abdominal distention,

## 2025-03-31 ENCOUNTER — TELEPHONE (OUTPATIENT)
Dept: FAMILY MEDICINE CLINIC | Age: 55
End: 2025-03-31

## 2025-03-31 NOTE — TELEPHONE ENCOUNTER
Isaiah Fermin    This is just an FYI [facility] called Joi and she does not want to be seen by them  at this time     She told the  her levels are always elevated     Thanks

## 2025-04-04 ENCOUNTER — TELEPHONE (OUTPATIENT)
Dept: PRIMARY CARE CLINIC | Age: 55
End: 2025-04-04

## 2025-04-04 NOTE — TELEPHONE ENCOUNTER
Patient called office and said they denied her CT scan. I advised that the hospital just called and let us know this also.

## 2025-05-29 DIAGNOSIS — E11.65 TYPE 2 DIABETES MELLITUS WITH HYPERGLYCEMIA, WITHOUT LONG-TERM CURRENT USE OF INSULIN (HCC): ICD-10-CM

## 2025-05-29 DIAGNOSIS — E55.9 VITAMIN D DEFICIENCY: ICD-10-CM

## 2025-05-29 DIAGNOSIS — I10 PRIMARY HYPERTENSION: ICD-10-CM

## 2025-05-29 RX ORDER — ERGOCALCIFEROL 1.25 MG/1
50000 CAPSULE, LIQUID FILLED ORAL WEEKLY
Qty: 12 CAPSULE | Refills: 1 | Status: SHIPPED | OUTPATIENT
Start: 2025-05-29

## 2025-05-29 RX ORDER — EZETIMIBE 10 MG/1
10 TABLET ORAL DAILY
Qty: 90 TABLET | Refills: 1 | Status: SHIPPED | OUTPATIENT
Start: 2025-05-29

## 2025-05-29 RX ORDER — GLIPIZIDE 5 MG/1
TABLET ORAL
Qty: 270 TABLET | Refills: 1 | Status: SHIPPED | OUTPATIENT
Start: 2025-05-29

## 2025-05-29 RX ORDER — MELOXICAM 15 MG/1
15 TABLET ORAL DAILY PRN
Qty: 90 TABLET | Refills: 1 | Status: SHIPPED | OUTPATIENT
Start: 2025-05-29

## 2025-05-29 RX ORDER — LISINOPRIL 10 MG/1
10 TABLET ORAL DAILY
Qty: 90 TABLET | Refills: 1 | Status: SHIPPED | OUTPATIENT
Start: 2025-05-29

## 2025-05-29 NOTE — TELEPHONE ENCOUNTER
Name of Medication(s) Requested:  Requested Prescriptions     Pending Prescriptions Disp Refills    glipiZIDE (GLUCOTROL) 5 MG tablet 270 tablet 0     Sig: Take 2 tablets by mouth daily (with breakfast) AND 1 tablet Daily with supper.    ezetimibe (ZETIA) 10 MG tablet 90 tablet 1     Sig: Take 1 tablet by mouth daily    lisinopril (PRINIVIL;ZESTRIL) 10 MG tablet 90 tablet 1     Sig: Take 1 tablet by mouth daily    meloxicam (MOBIC) 15 MG tablet 90 tablet 1     Sig: Take 1 tablet by mouth daily as needed for Pain    vitamin D (ERGOCALCIFEROL) 1.25 MG (30713 UT) CAPS capsule 12 capsule 1     Sig: Take 1 capsule by mouth once a week       Medication is on current medication list Yes    Dosage and directions were verified? Yes    Quantity verified: 90 day supply     Pharmacy Verified?  Yes    Last Appointment:  3/21/2025    Future appts:  Future Appointments   Date Time Provider Department Center   6/19/2025 10:30 AM Jeny Pierre APRN - NP [facility]   7/7/2025 11:00 AM John Salas, DO [facility]        (If no appt send self scheduling link. .REFILLAPPT)  Scheduling request sent?     [] Yes  [x] No    Does patient need updated?  [] Yes  [x] No

## 2025-06-19 ENCOUNTER — OFFICE VISIT (OUTPATIENT)
Dept: PRIMARY CARE CLINIC | Age: 55
End: 2025-06-19
Payer: COMMERCIAL

## 2025-06-19 VITALS
OXYGEN SATURATION: 97 % | WEIGHT: 206 LBS | BODY MASS INDEX: 37.91 KG/M2 | HEIGHT: 62 IN | DIASTOLIC BLOOD PRESSURE: 72 MMHG | HEART RATE: 76 BPM | TEMPERATURE: 98 F | SYSTOLIC BLOOD PRESSURE: 122 MMHG

## 2025-06-19 DIAGNOSIS — Z11.4 SCREENING FOR HIV (HUMAN IMMUNODEFICIENCY VIRUS): ICD-10-CM

## 2025-06-19 DIAGNOSIS — K02.9 INFECTED DENTAL CARIES: ICD-10-CM

## 2025-06-19 DIAGNOSIS — E55.9 VITAMIN D DEFICIENCY: ICD-10-CM

## 2025-06-19 DIAGNOSIS — G43.009 MIGRAINE WITHOUT AURA AND WITHOUT STATUS MIGRAINOSUS, NOT INTRACTABLE: ICD-10-CM

## 2025-06-19 DIAGNOSIS — D84.9 IMMUNOSUPPRESSION: ICD-10-CM

## 2025-06-19 DIAGNOSIS — I10 PRIMARY HYPERTENSION: ICD-10-CM

## 2025-06-19 DIAGNOSIS — E11.65 TYPE 2 DIABETES MELLITUS WITH HYPERGLYCEMIA, WITHOUT LONG-TERM CURRENT USE OF INSULIN (HCC): Primary | ICD-10-CM

## 2025-06-19 DIAGNOSIS — E11.65 TYPE 2 DIABETES MELLITUS WITH HYPERGLYCEMIA, WITHOUT LONG-TERM CURRENT USE OF INSULIN (HCC): ICD-10-CM

## 2025-06-19 DIAGNOSIS — L40.50 PSORIATIC ARTHRITIS (HCC): ICD-10-CM

## 2025-06-19 DIAGNOSIS — D69.6 THROMBOCYTOPENIA: ICD-10-CM

## 2025-06-19 DIAGNOSIS — M06.9 RHEUMATOID ARTHRITIS, INVOLVING UNSPECIFIED SITE, UNSPECIFIED WHETHER RHEUMATOID FACTOR PRESENT (HCC): ICD-10-CM

## 2025-06-19 DIAGNOSIS — F33.0 MILD EPISODE OF RECURRENT MAJOR DEPRESSIVE DISORDER: ICD-10-CM

## 2025-06-19 DIAGNOSIS — K04.7 INFECTED DENTAL CARIES: ICD-10-CM

## 2025-06-19 PROBLEM — E11.649 TYPE 2 DIABETES MELLITUS WITH HYPOGLYCEMIA, WITHOUT LONG-TERM CURRENT USE OF INSULIN (HCC): Status: RESOLVED | Noted: 2021-10-26 | Resolved: 2025-06-19

## 2025-06-19 PROBLEM — M54.6 ACUTE BILATERAL THORACIC BACK PAIN: Status: RESOLVED | Noted: 2021-10-26 | Resolved: 2025-06-19

## 2025-06-19 PROBLEM — Z79.899 HIGH RISK MEDICATION USE: Status: RESOLVED | Noted: 2025-03-03 | Resolved: 2025-06-19

## 2025-06-19 LAB
ALBUMIN: 4.4 G/DL (ref 3.5–5.2)
ALP BLD-CCNC: 120 U/L (ref 35–104)
ALT SERPL-CCNC: 23 U/L (ref 0–35)
ANION GAP SERPL CALCULATED.3IONS-SCNC: 15 MMOL/L (ref 7–16)
AST SERPL-CCNC: 40 U/L (ref 0–35)
BASOPHILS ABSOLUTE: 0.01 K/UL (ref 0–0.2)
BASOPHILS RELATIVE PERCENT: 0 % (ref 0–2)
BILIRUB SERPL-MCNC: 0.5 MG/DL (ref 0–1.2)
BUN BLDV-MCNC: 16 MG/DL (ref 6–20)
CALCIUM SERPL-MCNC: 9.7 MG/DL (ref 8.6–10)
CHLORIDE BLD-SCNC: 102 MMOL/L (ref 98–107)
CHOLESTEROL, TOTAL: 223 MG/DL
CO2: 20 MMOL/L (ref 22–29)
CREAT SERPL-MCNC: 0.8 MG/DL (ref 0.5–1)
CREATININE URINE: 53.7 MG/DL (ref 29–226)
EOSINOPHILS ABSOLUTE: 0.09 K/UL (ref 0.05–0.5)
EOSINOPHILS RELATIVE PERCENT: 1 % (ref 0–6)
GFR, ESTIMATED: >90 ML/MIN/1.73M2
GLUCOSE BLD-MCNC: 161 MG/DL (ref 74–99)
HBA1C MFR BLD: 8.9 % (ref 4–5.6)
HCT VFR BLD CALC: 47.3 % (ref 34–48)
HDLC SERPL-MCNC: 57 MG/DL
HEMOGLOBIN: 15.1 G/DL (ref 11.5–15.5)
HIV AG/AB: NONREACTIVE
IMMATURE GRANULOCYTES %: 1 % (ref 0–5)
IMMATURE GRANULOCYTES ABSOLUTE: 0.04 K/UL (ref 0–0.58)
LDL CHOLESTEROL: 129 MG/DL
LYMPHOCYTES ABSOLUTE: 1.04 K/UL (ref 1.5–4)
LYMPHOCYTES RELATIVE PERCENT: 16 % (ref 20–42)
MCH RBC QN AUTO: 26.9 PG (ref 26–35)
MCHC RBC AUTO-ENTMCNC: 31.9 G/DL (ref 32–34.5)
MCV RBC AUTO: 84.2 FL (ref 80–99.9)
MICROALBUMIN/CREAT 24H UR: <12 MG/L (ref 0–20)
MICROALBUMIN/CREAT UR-RTO: <22 MCG/MG CREAT (ref 0–30)
MONOCYTES ABSOLUTE: 0.41 K/UL (ref 0.1–0.95)
MONOCYTES RELATIVE PERCENT: 6 % (ref 2–12)
NEUTROPHILS ABSOLUTE: 4.97 K/UL (ref 1.8–7.3)
NEUTROPHILS RELATIVE PERCENT: 76 % (ref 43–80)
PDW BLD-RTO: 13.6 % (ref 11.5–15)
PLATELET # BLD: 153 K/UL (ref 130–450)
PMV BLD AUTO: 11.1 FL (ref 7–12)
POTASSIUM SERPL-SCNC: 4.1 MMOL/L (ref 3.5–5.1)
RBC # BLD: 5.62 M/UL (ref 3.5–5.5)
SODIUM BLD-SCNC: 137 MMOL/L (ref 136–145)
TOTAL PROTEIN: 7.7 G/DL (ref 6.4–8.3)
TRIGL SERPL-MCNC: 186 MG/DL
VLDLC SERPL CALC-MCNC: 37 MG/DL
WBC # BLD: 6.6 K/UL (ref 4.5–11.5)

## 2025-06-19 PROCEDURE — 3074F SYST BP LT 130 MM HG: CPT | Performed by: NURSE PRACTITIONER

## 2025-06-19 PROCEDURE — G8427 DOCREV CUR MEDS BY ELIG CLIN: HCPCS | Performed by: NURSE PRACTITIONER

## 2025-06-19 PROCEDURE — 99214 OFFICE O/P EST MOD 30 MIN: CPT | Performed by: NURSE PRACTITIONER

## 2025-06-19 PROCEDURE — 3078F DIAST BP <80 MM HG: CPT | Performed by: NURSE PRACTITIONER

## 2025-06-19 PROCEDURE — 1036F TOBACCO NON-USER: CPT | Performed by: NURSE PRACTITIONER

## 2025-06-19 PROCEDURE — 3017F COLORECTAL CA SCREEN DOC REV: CPT | Performed by: NURSE PRACTITIONER

## 2025-06-19 PROCEDURE — 3052F HG A1C>EQUAL 8.0%<EQUAL 9.0%: CPT | Performed by: NURSE PRACTITIONER

## 2025-06-19 PROCEDURE — 2022F DILAT RTA XM EVC RTNOPTHY: CPT | Performed by: NURSE PRACTITIONER

## 2025-06-19 PROCEDURE — G8417 CALC BMI ABV UP PARAM F/U: HCPCS | Performed by: NURSE PRACTITIONER

## 2025-06-19 ASSESSMENT — ENCOUNTER SYMPTOMS
VOICE CHANGE: 0
APNEA: 0
ABDOMINAL DISTENTION: 0
CHEST TIGHTNESS: 0
COUGH: 0
ABDOMINAL PAIN: 0
SHORTNESS OF BREATH: 0
DIARRHEA: 0
CONSTIPATION: 0
COLOR CHANGE: 0
RHINORRHEA: 0
WHEEZING: 0
FACIAL SWELLING: 0
VOMITING: 0
EYES NEGATIVE: 1
NAUSEA: 0
BACK PAIN: 0

## 2025-06-19 NOTE — ASSESSMENT & PLAN NOTE
Not at goal.  Denies suicidal or homicidal ideation.  The patient is not interested in medication or counseling at this time.

## 2025-06-19 NOTE — ASSESSMENT & PLAN NOTE
At goal.  Continue lisinopril.    Orders:    CBC with Auto Differential; Future    Comprehensive Metabolic Panel; Future

## 2025-06-19 NOTE — PROGRESS NOTES
Expiration Date:   6/19/2026     Reason for Review::   Platelet    HIV Screen     Standing Status:   Future     Number of Occurrences:   1     Expected Date:   6/19/2025     Expiration Date:   6/19/2026    Lipid Panel     Standing Status:   Future     Number of Occurrences:   1     Expected Date:   6/19/2025     Expiration Date:   6/19/2026      Results  Labs  - Blood sugar: 03/2025, 212 mg/dL  - Platelets: 03/2025, Low  Assessment & Plan:     Assessment & Plan  Type 2 diabetes mellitus with hyperglycemia, without long-term current use of insulin (HCC)  Continue glipizide 10 mg in the morning and 5 mg nightly.  Check A1c and DENIS.  Consider increasing to 10 mg twice daily    Orders:    Hemoglobin A1C; Future    Albumin/Creatinine Ratio, Urine; Future    Lipid Panel; Future    Primary hypertension  At goal.  Continue lisinopril.    Orders:    CBC with Auto Differential; Future    Comprehensive Metabolic Panel; Future    Vitamin D deficiency  Check labs today.  Continue weekly supplementation.         Thrombocytopenia  Encourage patient to schedule with hematology.  Will check CBC and peripheral smear today.  Patient denies any unusual bleeding or bruising.  She has not been taking Humira.    Orders:    Path Review, Smear; Future    Screening for HIV (human immunodeficiency virus)     Screen for HIV x 1  Orders:    HIV Screen; Future    Infected dental caries  Encourage patient to follow-up with dentistry.  Will start the patient on Augmentin x 10 days.    Orders:    amoxicillin-clavulanate (AUGMENTIN) 875-125 MG per tablet; Take 1 tablet by mouth 2 times daily for 10 days    Rheumatoid arthritis, involving unspecified site, unspecified whether rheumatoid factor present ([facility])  Continue to follow with rheumatology.         Psoriatic arthritis ([facility])  Continue to follow with rheumatology.         Immunosuppression  Patient is currently not on Humira due to insurance restrictions.         Mild episode of recurrent major

## 2025-06-19 NOTE — ASSESSMENT & PLAN NOTE
Encourage patient to schedule with hematology.  Will check CBC and peripheral smear today.  Patient denies any unusual bleeding or bruising.  She has not been taking Humira.    Orders:    Path Review, Smear; Future

## 2025-06-19 NOTE — ASSESSMENT & PLAN NOTE
Continue glipizide 10 mg in the morning and 5 mg nightly.  Check A1c and DENIS.  Consider increasing to 10 mg twice daily    Orders:    Hemoglobin A1C; Future    Albumin/Creatinine Ratio, Urine; Future    Lipid Panel; Future

## 2025-06-20 LAB — PATHOLOGIST REVIEW: NORMAL

## 2025-06-24 ENCOUNTER — RESULTS FOLLOW-UP (OUTPATIENT)
Dept: PRIMARY CARE CLINIC | Age: 55
End: 2025-06-24

## 2025-06-24 DIAGNOSIS — D72.810 LYMPHOPENIA: Primary | ICD-10-CM

## 2025-06-24 RX ORDER — GLIPIZIDE 10 MG/1
10 TABLET ORAL
Qty: 60 TABLET | Refills: 3 | Status: SHIPPED | OUTPATIENT
Start: 2025-06-24

## 2025-07-07 ENCOUNTER — OFFICE VISIT (OUTPATIENT)
Dept: RHEUMATOLOGY | Age: 55
End: 2025-07-07
Payer: COMMERCIAL

## 2025-07-07 VITALS
SYSTOLIC BLOOD PRESSURE: 140 MMHG | HEIGHT: 62 IN | DIASTOLIC BLOOD PRESSURE: 94 MMHG | HEART RATE: 86 BPM | WEIGHT: 206 LBS | BODY MASS INDEX: 37.91 KG/M2

## 2025-07-07 DIAGNOSIS — Z79.899 HIGH RISK MEDICATION USE: ICD-10-CM

## 2025-07-07 DIAGNOSIS — D84.9 IMMUNOSUPPRESSION: ICD-10-CM

## 2025-07-07 DIAGNOSIS — E11.65 TYPE 2 DIABETES MELLITUS WITH HYPERGLYCEMIA, WITHOUT LONG-TERM CURRENT USE OF INSULIN (HCC): ICD-10-CM

## 2025-07-07 DIAGNOSIS — L40.50 PSORIATIC ARTHRITIS (HCC): Primary | ICD-10-CM

## 2025-07-07 PROCEDURE — 2022F DILAT RTA XM EVC RTNOPTHY: CPT | Performed by: INTERNAL MEDICINE

## 2025-07-07 PROCEDURE — G2211 COMPLEX E/M VISIT ADD ON: HCPCS | Performed by: INTERNAL MEDICINE

## 2025-07-07 PROCEDURE — 3017F COLORECTAL CA SCREEN DOC REV: CPT | Performed by: INTERNAL MEDICINE

## 2025-07-07 PROCEDURE — G8427 DOCREV CUR MEDS BY ELIG CLIN: HCPCS | Performed by: INTERNAL MEDICINE

## 2025-07-07 PROCEDURE — 3052F HG A1C>EQUAL 8.0%<EQUAL 9.0%: CPT | Performed by: INTERNAL MEDICINE

## 2025-07-07 PROCEDURE — G8417 CALC BMI ABV UP PARAM F/U: HCPCS | Performed by: INTERNAL MEDICINE

## 2025-07-07 PROCEDURE — 99215 OFFICE O/P EST HI 40 MIN: CPT | Performed by: INTERNAL MEDICINE

## 2025-07-07 PROCEDURE — 3077F SYST BP >= 140 MM HG: CPT | Performed by: INTERNAL MEDICINE

## 2025-07-07 PROCEDURE — 1036F TOBACCO NON-USER: CPT | Performed by: INTERNAL MEDICINE

## 2025-07-07 PROCEDURE — 3080F DIAST BP >= 90 MM HG: CPT | Performed by: INTERNAL MEDICINE

## 2025-07-07 ASSESSMENT — ENCOUNTER SYMPTOMS
VOMITING: 0
SHORTNESS OF BREATH: 0
ABDOMINAL PAIN: 0
NAUSEA: 0
COLOR CHANGE: 0
COUGH: 0
TROUBLE SWALLOWING: 0
DIARRHEA: 0

## 2025-07-07 NOTE — PROGRESS NOTES
Joi Maloney 1970 is a 55 y.o. female, here for evaluation of the following chief complaint(s):  Follow-up (Patient here for follow up on PsA. )      Assessment & Plan   ASSESSMENT/PLAN:    Joi Maloney 1970 is a 55 y.o. female seen in follow-up for psoriatic arthritis.    1.  Psoriatic arthritis-there is apparently some question in the past of rheumatoid versus psoriatic arthritis.  Clinical picture is certainly more consistent with psoriatic arthritis with diffuse psoriatic skin disease and synovitis on exam indicative of severe exacerbation.  Last visit the plan was to start her on Humira but apparently she cannot have it shipped to her house and her local pharmacy was unable to fill it.  We will try sending it again through [facility] specialty pharmacy and she will need to coordinate where it can be sent possibly it can be sent to her local pharmacy for her to  even though it would still be filled through the specialty pharmacy.  She has failed methotrexate in the past.    2.  Immunosuppression-I recommend she stay up-to-date on vaccinations.  In the event of any acute infectious illness she should hold Humira.    3.  High risk medication use-up-to-date on TB and hepatitis.  Will monitor for infection.  She had recent lab work which I have personally reviewed.    4.  Type 2 diabetes-will try to avoid steroids is much as possible.    5.  History of uterine cancer-no concern with TNF inhibitors with solid tumor cancer history.    1. Psoriatic arthritis (HCC)  -     adalimumab (HUMIRA) 40 MG/0.4ML AJKT injection pen kit; Inject 0.4 mLs into the skin every 14 days, Disp-2 each, R-5Normal  2. Immunosuppression  3. High risk medication use  4. Type 2 diabetes mellitus with hyperglycemia, without long-term current use of insulin (HCC)        Return in about 4 months (around 11/7/2025).         Subjective   SUBJECTIVE/OBJECTIVE:    HPI: Joi Maloney 1970 is a 55 y.o. female seen in

## 2025-07-08 ENCOUNTER — TELEPHONE (OUTPATIENT)
Dept: PHARMACY | Facility: CLINIC | Age: 55
End: 2025-07-08